# Patient Record
Sex: MALE | Race: WHITE | NOT HISPANIC OR LATINO | Employment: OTHER | URBAN - METROPOLITAN AREA
[De-identification: names, ages, dates, MRNs, and addresses within clinical notes are randomized per-mention and may not be internally consistent; named-entity substitution may affect disease eponyms.]

---

## 2021-11-22 PROBLEM — R94.31 ABNORMAL EKG: Status: ACTIVE | Noted: 2021-11-22

## 2021-11-22 PROBLEM — I10 ESSENTIAL HYPERTENSION: Status: ACTIVE | Noted: 2021-11-22

## 2021-11-22 PROBLEM — R42 DIZZINESS: Status: ACTIVE | Noted: 2021-11-22

## 2021-11-22 PROBLEM — R07.9 CHEST PAIN: Status: ACTIVE | Noted: 2021-11-22

## 2021-11-22 PROBLEM — E78.5 HYPERLIPIDEMIA LDL GOAL <100: Status: ACTIVE | Noted: 2021-11-22

## 2021-11-22 PROBLEM — I25.118 CORONARY ARTERY DISEASE OF NATIVE ARTERY OF NATIVE HEART WITH STABLE ANGINA PECTORIS: Status: ACTIVE | Noted: 2021-11-22

## 2021-11-22 NOTE — PROGRESS NOTES
OFFICE VISIT  NOTE  [facility] CARDIOLOGY      Name: Len Saenz    Date: 2021  MRN:  [id number]  :  1955      REFERRING/PRIMARY PROVIDER:  Dania Kuhn PA    Chief Complaint   Patient presents with   • Abnormal ECG       HPI: Len Saenz is a 66 y.o. male who presents today for new consultation for abnormal EKG. history of CAD status post cath 3/2012 with Dr. Rodgers, 50% LAD lesion at that time.  History of hypertension and hyperlipidemia.  Recent dizzy spells when turning his head, diagnosed with BPPV saw ENT, symptoms resolved.  Does get slight dizziness when he stands up.  Heart rate jumps up and down, can go above 100 at times at home but overall asymptomatic, no palpitations.  Found to have abnormal EKG at PCPs office, computer read junctional rhythm but it appears sinus.  Denies chest pain with exertion such as doing outdoor activity.  Recent diagnosis of prostate cancer may be starting radiation therapy soon.    Past Medical History:   Diagnosis Date   • Cancer (HCC)     Prostate cancer, starts treatment approx. 2022. Low grade   • Coronary artery disease    • Hyperlipidemia    • Hypertension        Past Surgical History:   Procedure Laterality Date   • CARDIAC CATHETERIZATION      3/2012 with Dr. Rodgers       Social History     Socioeconomic History   • Marital status:    Tobacco Use   • Smoking status: Former Smoker     Types: Cigarettes     Quit date: 2012     Years since quittin.0   • Smokeless tobacco: Never Used   • Tobacco comment: Social    Vaping Use   • Vaping Use: Never used   Substance and Sexual Activity   • Alcohol use: Yes     Comment: Social only   • Drug use: Never   • Sexual activity: Defer       Family History   Problem Relation Age of Onset   • Liver disease Mother    • Heart failure Father    • Ovarian cancer Daughter    • Heart disease Brother    • Heart disease Brother         ROS:   Constitutional no fever,  no weight loss  "  Skin no rash, no subcutaneous nodules   Otolaryngeal no difficulty swallowing   Cardiovascular See HPI   Pulmonary no cough, no sputum production   Gastrointestinal no constipation, no diarrhea   Genitourinary no dysuria, no hematuria   Hematologic no easy bruisability, no abnormal bleeding   Musculoskeletal no muscle pain   Neurologic no dizziness, no falls         Allergies   Allergen Reactions   • Bactrim [Sulfamethoxazole-Trimethoprim] Rash     Rash, Hives and Itching         Current Outpatient Medications:   •  aspirin 81 MG EC tablet, Take 81 mg by mouth Daily., Disp: , Rfl:   •  Cholecalciferol (Vitamin D-3) 25 MCG (1000 UT) capsule, Take 1,000 Units by mouth Daily., Disp: , Rfl:   •  lisinopril (PRINIVIL,ZESTRIL) 10 MG tablet, Take 10 mg by mouth Daily., Disp: , Rfl:   •  rosuvastatin (CRESTOR) 10 MG tablet, Take 10 mg by mouth Daily., Disp: , Rfl:   •  vitamin B-12 (CYANOCOBALAMIN) 100 MCG tablet, Take 50 mcg by mouth Daily., Disp: , Rfl:     Vitals:    11/30/21 1313   BP: 126/86   BP Location: Right arm   Patient Position: Sitting   Cuff Size: Adult   Pulse: 103   SpO2: 95%   Weight: 97.5 kg (215 lb)   Height: 182.9 cm (72\")     Body mass index is 29.16 kg/m².    PHYSICAL EXAM:    General Appearance:   · well developed  · well nourished  HENT:   · oropharynx moist  · lips not cyanotic  Neck:  · thyroid not enlarged  · supple  Respiratory:  · no respiratory distress  · normal breath sounds  · no rales  Cardiovascular:  · no jugular venous distention  · regular rhythm  · apical impulse normal  · S1 normal, S2 normal  · no S3, no S4   · 2/6 systolic murmur  · no rub, no thrill  · carotid pulses normal; no bruit  · lower extremity edema: none      Musculoskeletal:  · no clubbing of fingers.   · normocephalic, head atraumatic  Skin:   · warm, dry  Psychiatric:  · judgement and insight appropriate  · normal mood and affect    RESULTS:     ECG 12 Lead    Date/Time: 11/30/2021 1:49 PM  Performed by: Markie" Jay WILLINGHAM MD  Authorized by: Jay Aden MD   Comparison: not compared with previous ECG   Rhythm: sinus tachycardia  Rate: tachycardic  BPM: 103  QRS axis: normal    Clinical impression: non-specific ECG                  Labs:  No results found for: CHOL, TRIG, HDL, LDL, AST, ALT  No results found for: HGBA1C  No components found for: CREATINININE  No results found for: EGFRIFNONA    Most recent PCP note, imaging tests, and labs reviewed.    ASSESSMENT:  Problem List Items Addressed This Visit        Cardiac and Vasculature    Hyperlipidemia LDL goal <70    Relevant Medications    rosuvastatin (CRESTOR) 10 MG tablet    Essential hypertension    Relevant Medications    lisinopril (PRINIVIL,ZESTRIL) 10 MG tablet    Chest pain - Primary    Overview     2011 [facility] nonobstructive CAD 50% LAD         Coronary artery disease of native artery of native heart with stable angina pectoris (HCC)    Overview     2011 [facility] nonobstructive CAD 50% LAD         Abnormal EKG       Symptoms and Signs    Dizziness          PLAN:    1.  Murmur:  It was transient on exam, proceed with echocardiogram for further evaluation.    2.  Abnormal EKG:  EKG at PCPs office reviewed, it does not appear to be junctional rhythm, it appears to be sinus rhythm, no specific change, today's EKG shows sinus tachycardia with a heart rate 103.  No ischemic symptoms currently, defer ischemic evaluation at this time.    3.  Sinus tachycardia:  Advised patient increase water intake to 64 ounces daily.  May need low-dose metoprolol in the future if he becomes symptomatic with palpitations.    4.  CAD:  Continue secondary prevention with aspirin and statin therapy.  He states LDL was 30 last checked.    5.  Hyperlipidemia:  Continue atorvastatin current dose.    5.  Hypertension:  Home blood pressure log reviewed, well controlled currently.  Continue current medical regimen        Return to clinic in 9 months, or sooner as needed.    Thank you for the  opportunity to share in the care of your patient; please do not hesitate to call me with any questions.     Jay Aden MD, [facility]  Office: [phone] [address] [place] [address]    11/30/21

## 2021-11-30 ENCOUNTER — OFFICE VISIT (OUTPATIENT)
Dept: CARDIOLOGY | Facility: CLINIC | Age: 66
End: 2021-11-30

## 2021-11-30 VITALS
SYSTOLIC BLOOD PRESSURE: 126 MMHG | BODY MASS INDEX: 29.12 KG/M2 | OXYGEN SATURATION: 95 % | HEIGHT: 72 IN | HEART RATE: 103 BPM | DIASTOLIC BLOOD PRESSURE: 86 MMHG | WEIGHT: 215 LBS

## 2021-11-30 DIAGNOSIS — E78.5 HYPERLIPIDEMIA LDL GOAL <70: ICD-10-CM

## 2021-11-30 DIAGNOSIS — I25.118 CORONARY ARTERY DISEASE OF NATIVE ARTERY OF NATIVE HEART WITH STABLE ANGINA PECTORIS (HCC): ICD-10-CM

## 2021-11-30 DIAGNOSIS — I10 ESSENTIAL HYPERTENSION: ICD-10-CM

## 2021-11-30 DIAGNOSIS — R07.9 CHEST PAIN, UNSPECIFIED TYPE: Primary | ICD-10-CM

## 2021-11-30 DIAGNOSIS — R42 DIZZINESS: ICD-10-CM

## 2021-11-30 DIAGNOSIS — R94.31 ABNORMAL EKG: ICD-10-CM

## 2021-11-30 PROCEDURE — 99204 OFFICE O/P NEW MOD 45 MIN: CPT | Performed by: INTERNAL MEDICINE

## 2021-11-30 PROCEDURE — 93000 ELECTROCARDIOGRAM COMPLETE: CPT | Performed by: INTERNAL MEDICINE

## 2021-11-30 RX ORDER — ROSUVASTATIN CALCIUM 10 MG/1
10 TABLET, COATED ORAL DAILY
COMMUNITY
End: 2022-06-21 | Stop reason: SDUPTHER

## 2021-11-30 RX ORDER — ASPIRIN 81 MG/1
81 TABLET ORAL DAILY
COMMUNITY

## 2021-11-30 RX ORDER — CHOLECALCIFEROL (VITAMIN D3) 25 MCG
1000 CAPSULE ORAL DAILY
COMMUNITY

## 2021-11-30 RX ORDER — LISINOPRIL 10 MG/1
10 TABLET ORAL DAILY
COMMUNITY
End: 2022-06-21 | Stop reason: SDUPTHER

## 2021-11-30 RX ORDER — UBIDECARENONE 75 MG
50 CAPSULE ORAL DAILY
COMMUNITY

## 2021-12-16 ENCOUNTER — TELEPHONE (OUTPATIENT)
Dept: CARDIOLOGY | Facility: CLINIC | Age: 66
End: 2021-12-16

## 2021-12-16 NOTE — TELEPHONE ENCOUNTER
----- Message from Jay Aden MD sent at 12/15/2021  6:19 PM EST -----  Please inform the patient of their test results. Thank you.

## 2021-12-28 ENCOUNTER — TELEPHONE (OUTPATIENT)
Dept: RADIATION ONCOLOGY | Facility: HOSPITAL | Age: 66
End: 2021-12-28

## 2022-01-03 ENCOUNTER — HOSPITAL ENCOUNTER (OUTPATIENT)
Dept: RADIATION ONCOLOGY | Facility: HOSPITAL | Age: 67
Setting detail: RADIATION/ONCOLOGY SERIES
Discharge: HOME | End: 2022-01-03

## 2022-01-03 ENCOUNTER — OFFICE VISIT (OUTPATIENT)
Dept: RADIATION ONCOLOGY | Facility: HOSPITAL | Age: 67
End: 2022-01-03

## 2022-01-03 VITALS
DIASTOLIC BLOOD PRESSURE: 81 MMHG | HEART RATE: 95 BPM | RESPIRATION RATE: 16 BRPM | SYSTOLIC BLOOD PRESSURE: 122 MMHG | BODY MASS INDEX: 29.28 KG/M2 | HEIGHT: 72 IN | TEMPERATURE: 97.9 F | OXYGEN SATURATION: 98 % | WEIGHT: 216.2 LBS

## 2022-01-03 DIAGNOSIS — C61 PROSTATE CANCER: Primary | ICD-10-CM

## 2022-01-03 PROCEDURE — G0463 HOSPITAL OUTPT CLINIC VISIT: HCPCS

## 2022-01-03 NOTE — PROGRESS NOTES
CONSULTATION NOTE      :                                                          1955  DATE OF CONSULTATION:                       1/3/2022   REQUESTING PHYSICIAN:                   Bernardino Bahena, *  REASON FOR CONSULTATION:           Prostate cancer (HCC)  - Stage I (cT1c, cN0, cM0, PSA: 5.3, Grade Group: 1)       BRIEF HISTORY:  The patient is a very pleasant 66 y.o. male  with recently diagnosed prostate cancer.  He is known to me as his wife is a patient of mine and a long-term survivor of metastatic neoplasm.  He has family history of prostate cancer in a brother who underwent conventionally fractionated radiotherapy and is in remission.  He has a personal history of BPH, ED, and episode of hematuria in 2017 with negative work-up on cystoscopy and CT urethrogram.  He has had slowly rising PSA values increasing from 2.6 ng/mL in  to the more recent elevated values of 4.01 ng/ml in May 2021 and maximum value of 5.25 ng/ml on 2021.  Prostate biopsy performed 2021 initially reported ASAP involving 1 out of 12 cores from the left lateral apex.  External pathology review at [facility] by Dr. Ruano reported presence of prostatic adenocarcinoma, Laureen's 3+3 = 6, involving a small focus, less than 5% of submitted tissue.  There was presence of perineural invasion.  Patient is otherwise very healthy and active.  He certainly has a life expectancy of greater than 10 years predicted.  He would like to proceed with definitive treatment and is interested in a nonsurgical treatment approach.    Allergy:   Allergies   Allergen Reactions   • Bactrim [Sulfamethoxazole-Trimethoprim] Rash     Rash, Hives and Itching       Social History:   Social History     Socioeconomic History   • Marital status:    Tobacco Use   • Smoking status: Former Smoker     Types: Cigarettes     Quit date: 2012     Years since quittin.0   • Smokeless tobacco: Never Used   • Tobacco comment: Social     Vaping Use   • Vaping Use: Never used   Substance and Sexual Activity   • Alcohol use: Yes     Comment: Social only once per month   • Drug use: Never   • Sexual activity: Defer       Past Medical History:   Past Medical History:   Diagnosis Date   • Cancer (HCC)     Prostate cancer, starts treatment approx. Jan 2022. Low grade   • Coronary artery disease    • Hyperlipidemia    • Hypertension    • Prostate cancer (HCC)        Family History: family history includes Heart disease in his brother, brother, and mother; Liver disease in his father; Ovarian cancer in his daughter.     Surgical History:   Past Surgical History:   Procedure Laterality Date   • APPENDECTOMY     • CARDIAC CATHETERIZATION      3/2012 with Dr. Rodgers   • COLONOSCOPY  2017   • PROSTATE BIOPSY          Review of Systems:   Review of Systems   Neurological: Positive for dizziness and light-headedness.                IPSS Questionnaire (AUA-7):  Over the past month…    1)  Incomplete Emptying  How often have you had a sensation of not emptying your bladder?  1 - Less than 1 time in 5   2)  Frequency  How often have you had to urinate less than every two hours? 1 - Less than 1 time in 5   3)  Intermittency  How often have you found you stopped and started again several times when you urinated?  2 - Less than half the time   4) Urgency  How often have you found it difficult to postpone urination?  1 - Less than 1 time in 5   5) Weak Stream  How often have you had a weak urinary stream?  1 - Less than 1 time in 5   6) Straining  How often have you had to push or strain to begin urination?  0 - Not at all   7) Nocturia  How many times did you typically get up at night to urinate?  2 - 2 times   Total Score:  8       Quality of life due to urinary symptoms:  If you were to spend the rest of your life with your urinary condition the way it is now, how would you feel about that? 3-Mixed   Urine Leakage (Incontinence) 1-Mild (A few drops a day, no pad  "use)     Sexual Health Inventory  Current Status    1)  How do you rate your confidence that you could achieve and keep an erection? 1-Very Low   2) When you had erections with sexual stimulation, how often were your erections hard enough for penetration (entering your partner)? 0-No sexual activity   3)  During sexual intercourse, how often were you able to maintain your erection after you had penetrated (entered) into your partner? 0-Did not attempt intercourse   4) During sexual intercourse, how difficult was it to maintain your erection to completion of intercourse? 0-Did not attempt intercourse   5) When you attempted sexual intercourse, how often was it satisfactory to you? 0-No sexual activity   Total Score: 1       Bowel Health Inventory  Current Status: 0-No problems, no rectal bleeding, no discharge, less then 5 bowel movements a day                 Objective   VITAL SIGNS:   Vitals:    01/03/22 1257   BP: 122/81   Pulse: 95   Resp: 16   Temp: 97.9 °F (36.6 °C)   SpO2: 98%  Comment: RA   Weight: 98.1 kg (216 lb 3.2 oz)   Height: 182.9 cm (72\")   PainSc: 0-No pain        Karnofsky score: 90       Physical Exam:   Physical Exam  Vitals and nursing note reviewed.   Constitutional:       Appearance: He is well-developed.   HENT:      Head: Normocephalic and atraumatic.   Cardiovascular:      Rate and Rhythm: Normal rate and regular rhythm.      Heart sounds: Normal heart sounds. No murmur heard.      Pulmonary:      Effort: Pulmonary effort is normal.      Breath sounds: Normal breath sounds. No wheezing or rales.   Chest:   Breasts:      Right: No supraclavicular adenopathy.      Left: No supraclavicular adenopathy.       Abdominal:      General: Bowel sounds are normal. There is no distension.      Palpations: Abdomen is soft.      Tenderness: There is no abdominal tenderness.   Genitourinary:     Prostate: Enlarged ( 40 to 50 cc, symmetrically enlarged, smooth, no nodules or induration.  Seminal vesicles " are nonpalpable.). Not tender and no nodules present.      Rectum: No mass, tenderness, external hemorrhoid or internal hemorrhoid. Normal anal tone.   Musculoskeletal:         General: No tenderness. Normal range of motion.      Cervical back: Normal range of motion and neck supple.   Lymphadenopathy:      Cervical: No cervical adenopathy.      Upper Body:      Right upper body: No supraclavicular adenopathy.      Left upper body: No supraclavicular adenopathy.   Skin:     General: Skin is warm and dry.   Neurological:      Mental Status: He is alert and oriented to person, place, and time.      Sensory: No sensory deficit.   Psychiatric:         Behavior: Behavior normal.         Thought Content: Thought content normal.         Judgment: Judgment normal.              The following portions of the patient's history were reviewed and updated as appropriate: allergies, current medications, past family history, past medical history, past social history, past surgical history and problem list.    Assessment:   Assessment      Prostate cancer, [place] 3+3 = 6, clinical stage I (T1c, N0, M0), PSA 5.25 ng/ml.  He has low risk disease with small volume and low-grade but presence of perineural invasion per outside pathology review at [facility] by Dr. Ruano.  Patient is otherwise very healthy and wishes to pursue definitive treatment intervention rather than active surveillance.  We reviewed the radiation treatment options of IMRT, SBRT and brachytherapy.  He would like to undergo definitive treatment with stereotactic body radiotherapy.  The CyberKnife treatment procedure has been reviewed in detail today and informed consent was obtained.    RECOMMENDATIONS: He will return to Dr. Bahena for placement of gold seed fiducials.  He will subsequently return here for treatment planning CT and MRI pelvis.  The prostate gland will receive 5 daily fractions of 7 Frye each, delivered on the CyberKnife.    Follow Up:   Return  in about 2 weeks (around 1/17/2022) for Office Visit, Simulation.  There are no diagnoses linked to this encounter.     Janak Mart MD

## 2022-01-04 DIAGNOSIS — C61 PROSTATE CANCER: Primary | ICD-10-CM

## 2022-01-20 ENCOUNTER — DOCUMENTATION (OUTPATIENT)
Dept: NUTRITION | Facility: HOSPITAL | Age: 67
End: 2022-01-20

## 2022-01-27 ENCOUNTER — HOSPITAL ENCOUNTER (OUTPATIENT)
Dept: RADIATION ONCOLOGY | Facility: HOSPITAL | Age: 67
Discharge: HOME | End: 2022-01-27

## 2022-01-27 ENCOUNTER — OFFICE VISIT (OUTPATIENT)
Dept: RADIATION ONCOLOGY | Facility: HOSPITAL | Age: 67
End: 2022-01-27

## 2022-01-27 ENCOUNTER — HOSPITAL ENCOUNTER (OUTPATIENT)
Dept: MRI IMAGING | Facility: HOSPITAL | Age: 67
Discharge: HOME | End: 2022-01-27
Admitting: RADIOLOGY

## 2022-01-27 VITALS
RESPIRATION RATE: 16 BRPM | OXYGEN SATURATION: 96 % | TEMPERATURE: 96.9 F | BODY MASS INDEX: 29.01 KG/M2 | WEIGHT: 214.2 LBS | HEIGHT: 72 IN | DIASTOLIC BLOOD PRESSURE: 82 MMHG | SYSTOLIC BLOOD PRESSURE: 134 MMHG | HEART RATE: 98 BPM

## 2022-01-27 DIAGNOSIS — C61 PROSTATE CANCER: ICD-10-CM

## 2022-01-27 PROCEDURE — 77290 THER RAD SIMULAJ FIELD CPLX: CPT | Performed by: RADIOLOGY

## 2022-01-27 PROCEDURE — G0463 HOSPITAL OUTPT CLINIC VISIT: HCPCS | Performed by: RADIOLOGY

## 2022-01-27 PROCEDURE — 77399 UNLISTED PX MED RADJ PHYSICS: CPT | Performed by: RADIOLOGY

## 2022-01-27 PROCEDURE — 72195 MRI PELVIS W/O DYE: CPT

## 2022-01-27 RX ORDER — TAMSULOSIN HYDROCHLORIDE 0.4 MG/1
1 CAPSULE ORAL DAILY
Qty: 30 CAPSULE | Refills: 11 | Status: SHIPPED | OUTPATIENT
Start: 2022-01-27 | End: 2022-03-31

## 2022-01-27 NOTE — PROGRESS NOTES
RE-EVALUATION    PATIENT:                                                      Len Saenz  :                                                          1955  DATE:                          2022   DIAGNOSIS:     Prostate cancer (HCC)  - Stage I (cT1c, cN0, cM0, PSA: 5.3, Grade Group: 1)         BRIEF HISTORY:  The patient is a very pleasant 66 y.o. male  with low risk prostate cancer but presence of perineural invasion.  He chose to undergo definitive treatment with stereotactic body radiotherapy.  He underwent placement of gold seed fiducials by Dr. Bahena without difficulty.  He has noted no hematuria or change in voiding.  He has had no other change in health since informed consent was obtained on 1/3/2022.  Remains a good candidate for SBRT.  He returns today for simulation in preparation for treatment.  Has been following all recommended dietary restrictions and prep.    Allergies   Allergen Reactions   • Bactrim [Sulfamethoxazole-Trimethoprim] Rash     Rash, Hives and Itching       Review of Systems   Neurological:        HX positional vertigo   All other systems reviewed and are negative.           past month…     1)  Incomplete Emptying  How often have you had a sensation of not emptying your bladder?  1 - Less than 1 time in 5   2)  Frequency  How often have you had to urinate less than every two hours? 1 - Less than 1 time in 5   3)  Intermittency  How often have you found you stopped and started again several times when you urinated?  2 - Less than half the time   4) Urgency  How often have you found it difficult to postpone urination?  1 - Less than 1 time in 5   5) Weak Stream  How often have you had a weak urinary stream?  1 - Less than 1 time in 5   6) Straining  How often have you had to push or strain to begin urination?  0 - Not at all   7) Nocturia  How many times did you typically get up at night to urinate?  2 - 2 times   Total Score:  8         Quality of life due to urinary  "symptoms:  If you were to spend the rest of your life with your urinary condition the way it is now, how would you feel about that? 3-Mixed   Urine Leakage (Incontinence) 1-Mild (A few drops a day, no pad use)      Sexual Health Inventory  Current Status     1)  How do you rate your confidence that you could achieve and keep an erection? 1-Very Low   2) When you had erections with sexual stimulation, how often were your erections hard enough for penetration (entering your partner)? 0-No sexual activity   3)  During sexual intercourse, how often were you able to maintain your erection after you had penetrated (entered) into your partner? 0-Did not attempt intercourse   4) During sexual intercourse, how difficult was it to maintain your erection to completion of intercourse? 0-Did not attempt intercourse   5) When you attempted sexual intercourse, how often was it satisfactory to you? 0-No sexual activity   Total Score: 1         Bowel Health Inventory  Current Status: 0-No problems, no rectal bleeding, no discharge, less then 5 bowel movements a day                Objective   VITAL SIGNS:   Vitals:    01/27/22 1001   BP: 134/82   Pulse: 98   Resp: 16   Temp: 96.9 °F (36.1 °C)   TempSrc: Temporal   SpO2: 96%  Comment: RA   Weight: 97.2 kg (214 lb 3.2 oz)   Height: 182.9 cm (72\")   PainSc: 0-No pain        Karnofsky score: 90       Physical Exam  Vitals and nursing note reviewed.   Constitutional:       Appearance: He is well-developed.   HENT:      Head: Normocephalic and atraumatic.   Cardiovascular:      Rate and Rhythm: Normal rate and regular rhythm.      Heart sounds: No murmur heard.      Pulmonary:      Effort: Pulmonary effort is normal.      Breath sounds: No wheezing or rales.   Chest:   Breasts:      Right: No supraclavicular adenopathy.      Left: No supraclavicular adenopathy.       Abdominal:      General: There is no distension.      Palpations: Abdomen is soft.      Tenderness: There is no abdominal " tenderness.   Musculoskeletal:         General: No tenderness. Normal range of motion.      Cervical back: Normal range of motion and neck supple.   Lymphadenopathy:      Cervical: No cervical adenopathy.      Upper Body:      Right upper body: No supraclavicular adenopathy.      Left upper body: No supraclavicular adenopathy.   Skin:     General: Skin is warm and dry.   Neurological:      Mental Status: He is alert and oriented to person, place, and time.      Sensory: No sensory deficit.   Psychiatric:         Behavior: Behavior normal.         Thought Content: Thought content normal.         Judgment: Judgment normal.              The following portions of the patient's history were reviewed and updated as appropriate: allergies, current medications, past family history, past medical history, past social history, past surgical history and problem list.      IMPRESSION:  Prostate cancer, Laureen 3+3 = 6, clinical stage I (T1c, N0, M0), PSA 5.25 ng/ml.    RECOMMENDATIONS: Treatment planning CT and MRI pelvis to be performed today.  Prostate gland will receive 5 daily fractions of 7 Frye each, delivered on the CyberKnife.         Janak Mart MD     Approximately 10 to 15 minutes was spent with patient and reviewing the information.

## 2022-02-01 ENCOUNTER — HOSPITAL ENCOUNTER (OUTPATIENT)
Dept: RADIATION ONCOLOGY | Facility: HOSPITAL | Age: 67
Setting detail: RADIATION/ONCOLOGY SERIES
Discharge: HOME | End: 2022-02-01

## 2022-02-08 PROCEDURE — 77300 RADIATION THERAPY DOSE PLAN: CPT | Performed by: RADIOLOGY

## 2022-02-08 PROCEDURE — 77334 RADIATION TREATMENT AID(S): CPT | Performed by: RADIOLOGY

## 2022-02-08 PROCEDURE — 77295 3-D RADIOTHERAPY PLAN: CPT | Performed by: RADIOLOGY

## 2022-02-21 ENCOUNTER — HOSPITAL ENCOUNTER (OUTPATIENT)
Dept: RADIATION ONCOLOGY | Facility: HOSPITAL | Age: 67
Discharge: HOME | End: 2022-02-21

## 2022-02-21 PROCEDURE — 77290 THER RAD SIMULAJ FIELD CPLX: CPT | Performed by: RADIOLOGY

## 2022-02-21 PROCEDURE — 77373 STRTCTC BDY RAD THER TX DLVR: CPT | Performed by: RADIOLOGY

## 2022-02-22 ENCOUNTER — HOSPITAL ENCOUNTER (OUTPATIENT)
Dept: RADIATION ONCOLOGY | Facility: HOSPITAL | Age: 67
Discharge: HOME | End: 2022-02-22

## 2022-02-22 PROCEDURE — 77290 THER RAD SIMULAJ FIELD CPLX: CPT | Performed by: RADIOLOGY

## 2022-02-22 PROCEDURE — 77373 STRTCTC BDY RAD THER TX DLVR: CPT | Performed by: RADIOLOGY

## 2022-02-23 ENCOUNTER — HOSPITAL ENCOUNTER (OUTPATIENT)
Dept: RADIATION ONCOLOGY | Facility: HOSPITAL | Age: 67
Discharge: HOME | End: 2022-02-23

## 2022-02-23 PROCEDURE — 77373 STRTCTC BDY RAD THER TX DLVR: CPT | Performed by: RADIOLOGY

## 2022-02-23 PROCEDURE — 77290 THER RAD SIMULAJ FIELD CPLX: CPT | Performed by: RADIOLOGY

## 2022-02-24 ENCOUNTER — HOSPITAL ENCOUNTER (OUTPATIENT)
Dept: RADIATION ONCOLOGY | Facility: HOSPITAL | Age: 67
Discharge: HOME | End: 2022-02-24

## 2022-02-24 PROCEDURE — 77373 STRTCTC BDY RAD THER TX DLVR: CPT | Performed by: RADIOLOGY

## 2022-02-24 PROCEDURE — 77290 THER RAD SIMULAJ FIELD CPLX: CPT | Performed by: RADIOLOGY

## 2022-02-25 ENCOUNTER — HOSPITAL ENCOUNTER (OUTPATIENT)
Dept: RADIATION ONCOLOGY | Facility: HOSPITAL | Age: 67
Discharge: HOME | End: 2022-02-25

## 2022-02-25 PROCEDURE — 77290 THER RAD SIMULAJ FIELD CPLX: CPT | Performed by: RADIOLOGY

## 2022-02-25 PROCEDURE — 77373 STRTCTC BDY RAD THER TX DLVR: CPT | Performed by: RADIOLOGY

## 2022-02-25 PROCEDURE — 77336 RADIATION PHYSICS CONSULT: CPT | Performed by: RADIOLOGY

## 2022-03-14 ENCOUNTER — TELEPHONE (OUTPATIENT)
Dept: RADIATION ONCOLOGY | Facility: HOSPITAL | Age: 67
End: 2022-03-14

## 2022-03-14 NOTE — TELEPHONE ENCOUNTER
Pt called stating he is concerned about his bp.  He takes bp medicine and since starting the tamsulosin his bp has dropped to 90/60 and he feels light headed at times.  He denies any trouble urinating.  Discussed with Dr. Mart and called pt back.  Instructed to stop tamsulosin and if he has trouble urinating to call back and we will start something else.  Pt verbalized understanding.

## 2022-03-31 ENCOUNTER — OFFICE VISIT (OUTPATIENT)
Dept: RADIATION ONCOLOGY | Facility: HOSPITAL | Age: 67
End: 2022-03-31

## 2022-03-31 ENCOUNTER — HOSPITAL ENCOUNTER (OUTPATIENT)
Dept: RADIATION ONCOLOGY | Facility: HOSPITAL | Age: 67
Setting detail: RADIATION/ONCOLOGY SERIES
Discharge: HOME | End: 2022-03-31

## 2022-03-31 DIAGNOSIS — C61 PROSTATE CANCER: Primary | ICD-10-CM

## 2022-03-31 NOTE — PROGRESS NOTES
TELEMEDICINE FOLLOW UP NOTE    PATIENT:                                                      Len Saenz  MEDICAL RECORD #:                        [id number]  :                                                          1955  COMPLETION DATE:   2022  DIAGNOSIS:     Prostate cancer (HCC)  - Stage I (cT1c, cN0, cM0, PSA: 5.3, Grade Group: 1)    This visit has been rescheduled as a phone visit to comply with patient safety concerns in accordance with CDC recommendations in light of the COVID-19 pandemic.  Total time of discussion was 21 minutes.  Verbal consent given.    COVID-19 RISK SCREEN     1. Has the patient had close contact or exposure without PPE with a lab confirmed COVID-19 (+) person or a person under investigation (PUI) for COVID-19 infection?  -- No     2. Has the patient had respiratory symptoms, worsened/new cough and/or SOA, unexplained fever, or sudden loss of smell and/or taste in the past week? --  No    3. Has the patient completed COVID vaccination? --  Yes    4. Has the patient completed their Booster dose? --  Yes         BRIEF HISTORY:    Initial follow-up visit via telemedicine.  He has a history of low risk prostate cancer but presence of perineural invasion.  He underwent definitive treatment with CyberKnife SBRT.  He tolerated treatment well.  Grade 1 fatigue continues to raquel.  He did experience mild dysuria which was managed with OTC Azo and has since resolved.    He otherwise denies change to his baseline, mild urinary outflow obstructive symptoms.  No hematuria or incontinence.  He has discontinued use of Flomax related to hypotensive episodes.  He reports no change in urinary symptoms since discontinuation of alpha-blocker.  BP is now running in the low 100s/60s-70s.    Bowel function remains normal.  He continues with stable, baseline erectile dysfunction.  No new aches or pains.  Overall, he feels well.      IPSS Questionnaire (AUA-7):  Over the past month…    1)   Incomplete Emptying  How often have you had a sensation of not emptying your bladder?  1 - Less than 1 time in 5   2)  Frequency  How often have you had to urinate less than every two hours? 1 - Less than 1 time in 5   3)  Intermittency  How often have you found you stopped and started again several times when you urinated?  2 - Less than half the time   4) Urgency  How often have you found it difficult to postpone urination?  1 - Less than 1 time in 5   5) Weak Stream  How often have you had a weak urinary stream?  1 - Less than 1 time in 5   6) Straining  How often have you had to push or strain to begin urination?  0 - Not at all   7) Nocturia  How many times did you typically get up at night to urinate?  3 - 3 times   Total Score:  9       Quality of life due to urinary symptoms:  If you were to spend the rest of your life with your urinary condition the way it is now, how would you feel about that? 3-Mixed   Urine Leakage (Incontinence) 1-Mild (A few drops a day, no pad use)     Sexual Health Inventory  Current Status    1)  How do you rate your confidence that you could achieve and keep an erection? 1-Very Low   2) When you had erections with sexual stimulation, how often were your erections hard enough for penetration (entering your partner)? 0-No sexual activity   3)  During sexual intercourse, how often were you able to maintain your erection after you had penetrated (entered) into your partner? 0-Did not attempt intercourse   4) During sexual intercourse, how difficult was it to maintain your erection to completion of intercourse? 0-Did not attempt intercourse   5) When you attempted sexual intercourse, how often was it satisfactory to you? 0-No sexual activity   Total Score: 1       Bowel Health Inventory  Current Status: 0-No problems, no rectal bleeding, no discharge, less then 5 bowel movements a day           MEDICATIONS: Medication reconciliation for the patient was reviewed and confirmed in the  electronic medical record.    Review of Systems   Constitutional: Positive for fatigue.   Genitourinary: Positive for nocturia.    All other systems reviewed and are negative.          KPS 90%      Physical Exam  Pulmonary:      Respirations even, unlabored. No audible wheezing or cough.  Neurological:      A+Ox4, conversant, answers questions appropriately.  Psychiatric:     Judgement, affect, and decision-making WNL.    Limited physical exam as visit was conducted remotely via telephone in light of the COVID-19 pandemic.          The following portions of the patient's history were reviewed and updated as appropriate: allergies, current medications, past family history, past medical history, past social history, past surgical history and problem list.         Diagnoses and all orders for this visit:    1. Prostate cancer (HCC) (Primary)         IMPRESSION:  Prostate cancer, [place] 3+3 = 6, clinical stage I (T1c, N0, M0), PSA 5.25 ng/ml.  1 month status post CyberKnife SBRT.  He tolerated treatment well.  Acute residual radiation-related toxicities continue to raquel.  He has already discontinued use of Flomax.    The patient and I have reviewed the survivorship care plan in detail.  We discussed diagnosis, follow-up intervals, PSA monitoring, and expectations for response to treatment.  A copy of the care plan has been mailed to the patient.  A copy has also been sent to his PCP.    RECOMMENDATIONS:  Mr. Saenz continues urologic surveillance under the care of Dr. Bahena, with follow-up and repeat PSA scheduled 5/27/2022.    I spent a total of 35 minutes on today's visit, with more than 21 minutes in virtual communication with the patient via telephone, and the remainder of the time spent in reviewing the relevant history, records, available imaging, and for documentation.    Return in about 6 months (around 9/26/2022) for Office Visit.    Miky Hood, APRN

## 2022-06-21 ENCOUNTER — TELEPHONE (OUTPATIENT)
Dept: FAMILY MEDICINE CLINIC | Facility: CLINIC | Age: 67
End: 2022-06-21

## 2022-06-21 ENCOUNTER — OFFICE VISIT (OUTPATIENT)
Dept: FAMILY MEDICINE CLINIC | Facility: CLINIC | Age: 67
End: 2022-06-21

## 2022-06-21 VITALS
OXYGEN SATURATION: 98 % | HEART RATE: 89 BPM | DIASTOLIC BLOOD PRESSURE: 60 MMHG | HEIGHT: 72 IN | SYSTOLIC BLOOD PRESSURE: 106 MMHG | BODY MASS INDEX: 28.17 KG/M2 | WEIGHT: 208 LBS

## 2022-06-21 DIAGNOSIS — Z79.899 HIGH RISK MEDICATION USE: ICD-10-CM

## 2022-06-21 DIAGNOSIS — E53.8 B12 DEFICIENCY: ICD-10-CM

## 2022-06-21 DIAGNOSIS — R94.31 ABNORMAL EKG: ICD-10-CM

## 2022-06-21 DIAGNOSIS — M47.9 SPONDYLOSIS: ICD-10-CM

## 2022-06-21 DIAGNOSIS — I25.10 ATHEROSCLEROSIS OF NATIVE CORONARY ARTERY OF NATIVE HEART WITHOUT ANGINA PECTORIS: ICD-10-CM

## 2022-06-21 DIAGNOSIS — Z00.01 ENCOUNTER FOR GENERAL ADULT MEDICAL EXAMINATION WITH ABNORMAL FINDINGS: Primary | ICD-10-CM

## 2022-06-21 DIAGNOSIS — Z79.899 ENCOUNTER FOR LONG-TERM (CURRENT) USE OF OTHER MEDICATIONS: ICD-10-CM

## 2022-06-21 DIAGNOSIS — I10 BENIGN ESSENTIAL HYPERTENSION: ICD-10-CM

## 2022-06-21 DIAGNOSIS — N52.9 PRIMARY ERECTILE DYSFUNCTION: ICD-10-CM

## 2022-06-21 DIAGNOSIS — E55.9 VITAMIN D DEFICIENCY: ICD-10-CM

## 2022-06-21 DIAGNOSIS — C61 PROSTATE CANCER: ICD-10-CM

## 2022-06-21 DIAGNOSIS — N40.1 LOWER URINARY TRACT SYMPTOMS DUE TO BENIGN PROSTATIC HYPERPLASIA: ICD-10-CM

## 2022-06-21 DIAGNOSIS — R53.83 FATIGUE, UNSPECIFIED TYPE: ICD-10-CM

## 2022-06-21 DIAGNOSIS — F41.9 ANXIETY: ICD-10-CM

## 2022-06-21 DIAGNOSIS — L98.9 SKIN LESION: ICD-10-CM

## 2022-06-21 DIAGNOSIS — E78.2 MIXED HYPERLIPIDEMIA: ICD-10-CM

## 2022-06-21 PROBLEM — R97.20 RAISED PROSTATE SPECIFIC ANTIGEN: Status: ACTIVE | Noted: 2021-10-12

## 2022-06-21 PROCEDURE — 36415 COLL VENOUS BLD VENIPUNCTURE: CPT | Performed by: FAMILY MEDICINE

## 2022-06-21 PROCEDURE — 99397 PER PM REEVAL EST PAT 65+ YR: CPT | Performed by: FAMILY MEDICINE

## 2022-06-21 RX ORDER — ROSUVASTATIN CALCIUM 10 MG/1
10 TABLET, COATED ORAL DAILY
Qty: 90 TABLET | Refills: 3 | Status: SHIPPED | OUTPATIENT
Start: 2022-06-21

## 2022-06-21 RX ORDER — LISINOPRIL 10 MG/1
10 TABLET ORAL DAILY
Qty: 90 TABLET | Refills: 3 | Status: SHIPPED | OUTPATIENT
Start: 2022-06-21

## 2022-06-21 NOTE — TELEPHONE ENCOUNTER
PT FOR GOT TO LET YOU KNOW THAT HE WILL NEED A YR OF REFILLS ON THE FOLLOWING MEDS/  LISINOPRIL (PRINIVIL, ZESTRIL) 10MG TABLET  ROSUVASTATIN (CRESTOR) 10 MG TABLET  PLEASE SEND THESE OVER TO [facility] ON [address].

## 2022-06-22 LAB
ALBUMIN SERPL-MCNC: 4.3 G/DL (ref 3.8–4.8)
ALBUMIN/GLOB SERPL: 1.6 {RATIO} (ref 1.2–2.2)
ALP SERPL-CCNC: 82 IU/L (ref 44–121)
ALT SERPL-CCNC: 17 IU/L (ref 0–44)
AST SERPL-CCNC: 18 IU/L (ref 0–40)
BASOPHILS # BLD AUTO: 0.1 X10E3/UL (ref 0–0.2)
BASOPHILS NFR BLD AUTO: 1 %
BILIRUB SERPL-MCNC: 0.3 MG/DL (ref 0–1.2)
BUN SERPL-MCNC: 13 MG/DL (ref 8–27)
BUN/CREAT SERPL: 13 (ref 10–24)
CALCIUM SERPL-MCNC: 10.1 MG/DL (ref 8.6–10.2)
CHLORIDE SERPL-SCNC: 100 MMOL/L (ref 96–106)
CHOLEST SERPL-MCNC: 113 MG/DL (ref 100–199)
CO2 SERPL-SCNC: 24 MMOL/L (ref 20–29)
CREAT SERPL-MCNC: 1.03 MG/DL (ref 0.76–1.27)
EGFRCR SERPLBLD CKD-EPI 2021: 79 ML/MIN/1.73
EOSINOPHIL # BLD AUTO: 0.3 X10E3/UL (ref 0–0.4)
EOSINOPHIL NFR BLD AUTO: 3 %
ERYTHROCYTE [DISTWIDTH] IN BLOOD BY AUTOMATED COUNT: 13 % (ref 11.6–15.4)
FOLATE SERPL-MCNC: 10.5 NG/ML
GLOBULIN SER CALC-MCNC: 2.7 G/DL (ref 1.5–4.5)
GLUCOSE SERPL-MCNC: 100 MG/DL (ref 65–99)
HCT VFR BLD AUTO: 45.5 % (ref 37.5–51)
HDLC SERPL-MCNC: 39 MG/DL
HGB BLD-MCNC: 15.2 G/DL (ref 13–17.7)
IMM GRANULOCYTES # BLD AUTO: 0 X10E3/UL (ref 0–0.1)
IMM GRANULOCYTES NFR BLD AUTO: 0 %
LDLC SERPL CALC-MCNC: 46 MG/DL (ref 0–99)
LYMPHOCYTES # BLD AUTO: 2 X10E3/UL (ref 0.7–3.1)
LYMPHOCYTES NFR BLD AUTO: 26 %
MCH RBC QN AUTO: 30.9 PG (ref 26.6–33)
MCHC RBC AUTO-ENTMCNC: 33.4 G/DL (ref 31.5–35.7)
MCV RBC AUTO: 93 FL (ref 79–97)
MONOCYTES # BLD AUTO: 1.1 X10E3/UL (ref 0.1–0.9)
MONOCYTES NFR BLD AUTO: 14 %
NEUTROPHILS # BLD AUTO: 4.3 X10E3/UL (ref 1.4–7)
NEUTROPHILS NFR BLD AUTO: 56 %
PLATELET # BLD AUTO: 212 X10E3/UL (ref 150–450)
POTASSIUM SERPL-SCNC: 4.1 MMOL/L (ref 3.5–5.2)
PROT SERPL-MCNC: 7 G/DL (ref 6–8.5)
RBC # BLD AUTO: 4.92 X10E6/UL (ref 4.14–5.8)
SODIUM SERPL-SCNC: 139 MMOL/L (ref 134–144)
TRIGL SERPL-MCNC: 166 MG/DL (ref 0–149)
TSH SERPL DL<=0.005 MIU/L-ACNC: 2.91 UIU/ML (ref 0.45–4.5)
VIT B12 SERPL-MCNC: 813 PG/ML (ref 232–1245)
VLDLC SERPL CALC-MCNC: 28 MG/DL (ref 5–40)
WBC # BLD AUTO: 7.7 X10E3/UL (ref 3.4–10.8)

## 2022-06-23 NOTE — PROGRESS NOTES
"Chief Complaint  Annual Exam (Medicare wellness visit )    Subjective      Len Saenz presents to [facility]  History of Present Illness  Patient is here for annual physical exam.  States has been feeling well lately.  He was diagnosed with prostate cancer last year, but had 5 radiation treatments (which she calls CyberKnife) and those were successful, and his most recent PSA was down to 0.9, quite a drop from his baseline.  He also had a cardiac work-up last year and everything was okay, will be following up with cardiologist in September.  His only new complaint is he has a couple skin lesions 1 on his left side of his face and one on his left shin that probably requires dermatology referral.    Objective   Vital Signs:   Vitals:    06/21/22 0945   BP: 106/60   BP Location: Left arm   Patient Position: Sitting   Cuff Size: Large Adult   Pulse: 89   SpO2: 98%   Weight: 94.3 kg (208 lb)   Height: 182.9 cm (72\")      /60 (BP Location: Left arm, Patient Position: Sitting, Cuff Size: Large Adult)   Pulse 89   Ht 182.9 cm (72\")   Wt 94.3 kg (208 lb)   SpO2 98%   BMI 28.21 kg/m²     Body mass index is 28.21 kg/m².    Review of Systems   Constitutional: Negative for activity change, appetite change, chills, diaphoresis, fever and unexpected weight loss.   HENT: Negative for ear discharge, ear pain, facial swelling, mouth sores, nosebleeds, rhinorrhea, sinus pressure, sore throat, swollen glands, trouble swallowing and voice change.    Eyes: Negative for blurred vision, double vision, pain, redness and visual disturbance.   Respiratory: Negative for cough, choking, chest tightness, shortness of breath and wheezing.    Cardiovascular: Negative for chest pain, palpitations and leg swelling.        PND, orthopnea   Gastrointestinal: Negative for abdominal distention, abdominal pain, anal bleeding, blood in stool, constipation, diarrhea, nausea, vomiting and GERD.        Dysphagia, " odynophagia   Endocrine: Negative for polydipsia, polyphagia and polyuria.   Genitourinary: Negative for dysuria, hematuria and urinary incontinence.   Musculoskeletal: Negative for arthralgias (unusual/atypica), back pain, gait problem, joint swelling, myalgias and neck pain.   Skin: Negative for rash, skin lesions (worrisome/suspicious) and bruise.   Allergic/Immunologic: Negative for food allergies.   Neurological: Negative for dizziness, tremors, seizures, syncope, weakness, light-headedness, numbness, headache and memory problem.   Hematological: Negative for adenopathy. Does not bruise/bleed easily.   Psychiatric/Behavioral: Negative for suicidal ideas and depressed mood. The patient is not nervous/anxious.        Past History:  Medical History: has a past medical history of Anxiety state, Appendicitis (1968), Cancer (HCC), Chest pain, Coronary arteriosclerosis, Coronary artery disease, Drug dependence (HCC), Herniated lumbar intervertebral disc  EPIDURAL STERIOD X2 , SUCCESFUL (2011), High risk medication use, Hyperlipidemia, Hypertension, Megaloblastic anemia, Nicotine dependence, Prostate cancer (HCC), Spondylosis, and Vitamin D deficiency.   Surgical History: has a past surgical history that includes Cardiac catheterization; Prostate biopsy; Colonoscopy (2017); Appendectomy; Prostate Fiducial Marker Placement; and Cyberknife (02/25/2022).   Family History: family history includes Alcohol abuse in his father; Coronary artery disease in an other family member; Heart disease in his brother, brother, mother, and another family member; Hyperlipidemia in some other family members; Hypertension in some other family members; Liver disease in his father; Ovarian cancer in his daughter; Prostate cancer in an other family member.   Social History: reports that he quit smoking about 9 years ago. His smoking use included cigarettes. He has never used smokeless tobacco. He reports current alcohol use. He reports that he  does not use drugs.      Current Outpatient Medications:   •  aspirin 81 MG EC tablet, Take 81 mg by mouth Daily., Disp: , Rfl:   •  Cholecalciferol (Vitamin D-3) 25 MCG (1000 UT) capsule, Take 1,000 Units by mouth Daily., Disp: , Rfl:   •  lisinopril (PRINIVIL,ZESTRIL) 10 MG tablet, Take 1 tablet by mouth Daily., Disp: 90 tablet, Rfl: 3  •  rosuvastatin (CRESTOR) 10 MG tablet, Take 1 tablet by mouth Daily., Disp: 90 tablet, Rfl: 3  •  vitamin B-12 (CYANOCOBALAMIN) 100 MCG tablet, Take 50 mcg by mouth Daily., Disp: , Rfl:     Allergies: Bactrim [sulfamethoxazole-trimethoprim]    Physical Exam  Constitutional:       General: He is not in acute distress.     Appearance: He is normal weight. He is not toxic-appearing.   HENT:      Head: Normocephalic and atraumatic.      Right Ear: Ear canal and external ear normal.      Left Ear: Ear canal and external ear normal.      Nose: Nose normal. No rhinorrhea.      Mouth/Throat:      Mouth: Mucous membranes are moist.      Pharynx: Oropharynx is clear. No posterior oropharyngeal erythema.   Eyes:      General: No scleral icterus.     Extraocular Movements: Extraocular movements intact.      Conjunctiva/sclera: Conjunctivae normal.      Pupils: Pupils are equal, round, and reactive to light.   Neck:      Vascular: No carotid bruit.   Cardiovascular:      Rate and Rhythm: Normal rate and regular rhythm.      Pulses: Normal pulses.      Heart sounds: Normal heart sounds. No murmur heard.    No gallop.   Pulmonary:      Effort: Pulmonary effort is normal.      Breath sounds: Normal breath sounds. No wheezing or rales.   Chest:      Chest wall: No tenderness.   Abdominal:      General: Bowel sounds are normal. There is no distension.      Palpations: Abdomen is soft. There is no mass.      Tenderness: There is no abdominal tenderness. There is no guarding or rebound.   Musculoskeletal:         General: No swelling, tenderness or deformity. Normal range of motion.      Cervical  back: Normal range of motion. No rigidity.      Right lower leg: No edema.      Left lower leg: No edema.   Lymphadenopathy:      Cervical: No cervical adenopathy.   Skin:     General: Skin is warm and dry.      Capillary Refill: Capillary refill takes less than 2 seconds.      Coloration: Skin is not pale.      Findings: Lesion (Crusty grayish-white oval 0.5 cm lesion just left of nose, and reddish flaky crusty slightly raised lesion on left shin about 0.75 cm that looks suspicious) present. No erythema or rash.   Neurological:      General: No focal deficit present.      Mental Status: He is alert and oriented to person, place, and time.      Cranial Nerves: No cranial nerve deficit.      Sensory: No sensory deficit.      Motor: No weakness.      Coordination: Coordination normal.      Gait: Gait normal.   Psychiatric:         Mood and Affect: Mood normal.         Behavior: Behavior normal.         Judgment: Judgment normal.          Result Review :                  Assessment and Plan   Diagnoses and all orders for this visit:    1. Encounter for general adult medical examination with abnormal findings (Primary)  Patient was praised for healthy diet and regular exercise which he has been doing very well with.  We will refill his current medications and check labs.  He will be referred to dermatology.  The lesion just left of the nose looks a little bit like a seborrheic keratosis, but it has gotten larger and is in a high risk area, so it needs evaluation.  The lesion on the left shin looks very suspicious for squamous cell carcinoma.  Preventive health measures were discussed and I will see him back annually or sooner if needed  2. Atherosclerosis of native coronary artery of native heart without angina pectoris    3. Anxiety    4. Abnormal EKG    5. Prostate cancer (HCC)    6. High risk medication use  -     CBC Auto Differential; Future  -     Comprehensive Metabolic Panel; Future  -     CBC Auto  Differential  -     Comprehensive Metabolic Panel    7. Lower urinary tract symptoms due to benign prostatic hyperplasia    8. Primary erectile dysfunction    9. Spondylosis    10. Vitamin D deficiency    11. Benign essential hypertension    12. Mixed hyperlipidemia  -     Lipid Panel; Future  -     Lipid Panel    13. Encounter for long-term (current) use of other medications    14. B12 deficiency  -     Vitamin B12; Future  -     Folate; Future  -     Vitamin B12  -     Folate    15. Fatigue, unspecified type  -     TSH; Future  -     TSH    16. Skin lesion  -     Ambulatory Referral to Dermatology    Other orders  -     lisinopril (PRINIVIL,ZESTRIL) 10 MG tablet; Take 1 tablet by mouth Daily.  Dispense: 90 tablet; Refill: 3  -     rosuvastatin (CRESTOR) 10 MG tablet; Take 1 tablet by mouth Daily.  Dispense: 90 tablet; Refill: 3                 Follow Up   Return in about 1 year (around 6/21/2023) for Nurse - AWV Subsequent, Annual physical.  Patient was given instructions and counseling regarding his condition or for health maintenance advice. Please see specific information pulled into the AVS if appropriate.     Bin Cui MD

## 2022-08-25 ENCOUNTER — OFFICE VISIT (OUTPATIENT)
Dept: RADIATION ONCOLOGY | Facility: HOSPITAL | Age: 67
End: 2022-08-25

## 2022-08-25 ENCOUNTER — HOSPITAL ENCOUNTER (OUTPATIENT)
Dept: RADIATION ONCOLOGY | Facility: HOSPITAL | Age: 67
Setting detail: RADIATION/ONCOLOGY SERIES
Discharge: HOME | End: 2022-08-25

## 2022-08-25 VITALS
BODY MASS INDEX: 28.48 KG/M2 | WEIGHT: 210.3 LBS | OXYGEN SATURATION: 95 % | RESPIRATION RATE: 20 BRPM | HEIGHT: 72 IN | DIASTOLIC BLOOD PRESSURE: 74 MMHG | HEART RATE: 94 BPM | TEMPERATURE: 97.3 F | SYSTOLIC BLOOD PRESSURE: 115 MMHG

## 2022-08-25 DIAGNOSIS — C61 PROSTATE CANCER: Primary | ICD-10-CM

## 2022-08-25 PROCEDURE — G0463 HOSPITAL OUTPT CLINIC VISIT: HCPCS

## 2022-08-25 NOTE — PROGRESS NOTES
FOLLOW UP NOTE    PATIENT:                                                      Len Saenz  MEDICAL RECORD #:                        [id number]  :                                                          1955  COMPLETION DATE:   2022  DIAGNOSIS:     Prostate cancer (HCC)  - Stage I (cT1c, cN0, cM0, PSA: 5.3, Grade Group: 1)      BRIEF HISTORY:    Routine follow-up visit.  He has a history of low risk prostate cancer treated with CyberKnife stereotactic body radiotherapy.  He tolerated treatment very well.  He has no complaints regarding urinary voiding.  Acute posttreatment dysuria resolved fairly quickly.  He is no longer taking Azo or Flomax.  Bowel function is normal.  No diarrhea or hematochezia.  Energy is good.  He has no limitations in activity.  PSA value 0.936 ng/ml 2022.    MEDICATIONS: Medication reconciliation for the patient was reviewed and confirmed in the electronic medical record.    Review of Systems   All other systems reviewed and are negative.            IPSS Questionnaire (AUA-7):  Over the past month…     1)  Incomplete Emptying  How often have you had a sensation of not emptying your bladder?  0 -not at all   2)  Frequency  How often have you had to urinate less than every two hours? 0 -not at all   3)  Intermittency  How often have you found you stopped and started again several times when you urinated?  0 -not at all   4) Urgency  How often have you found it difficult to postpone urination?  0 -not at all   5) Weak Stream  How often have you had a weak urinary stream?  1 - Less than 1 time in 5   6) Straining  How often have you had to push or strain to begin urination?  0 - Not at all   7) Nocturia  How many times did you typically get up at night to urinate?  1 -1 time   Total Score:  2         Quality of life due to urinary symptoms:  If you were to spend the rest of your life with your urinary condition the way it is now, how would you feel about that? 1 -pleased    Urine Leakage (Incontinence) 1-Mild (A few drops a day, no pad use)      Sexual Health Inventory  Current Status     1)  How do you rate your confidence that you could achieve and keep an erection? 1-Very Low   2) When you had erections with sexual stimulation, how often were your erections hard enough for penetration (entering your partner)? 0-No sexual activity   3)  During sexual intercourse, how often were you able to maintain your erection after you had penetrated (entered) into your partner? 0-Did not attempt intercourse   4) During sexual intercourse, how difficult was it to maintain your erection to completion of intercourse? 0-Did not attempt intercourse   5) When you attempted sexual intercourse, how often was it satisfactory to you? 0-No sexual activity   Total Score: 1         Bowel Health Inventory  Current Status: 0-No problems, no rectal bleeding, no discharge, less then 5 bowel movements a day                     Physical Exam  Vitals and nursing note reviewed.   Constitutional:       Appearance: He is well-developed.   HENT:      Head: Normocephalic and atraumatic.   Cardiovascular:      Rate and Rhythm: Normal rate and regular rhythm.      Heart sounds: Normal heart sounds. No murmur heard.  Pulmonary:      Effort: Pulmonary effort is normal.      Breath sounds: Normal breath sounds. No wheezing or rales.   Chest:   Breasts:      Right: No supraclavicular adenopathy.      Left: No supraclavicular adenopathy.       Abdominal:      General: Bowel sounds are normal. There is no distension.      Palpations: Abdomen is soft.      Tenderness: There is no abdominal tenderness.   Genitourinary:     Prostate: Enlarged ( 30 cc, symmetric enlarged, smooth, soft, no nodules or induration.). Not tender and no nodules present.      Rectum: No mass, tenderness, external hemorrhoid or internal hemorrhoid. Normal anal tone.   Musculoskeletal:         General: No tenderness. Normal range of motion.      Cervical  "back: Normal range of motion and neck supple.   Lymphadenopathy:      Cervical: No cervical adenopathy.      Upper Body:      Right upper body: No supraclavicular adenopathy.      Left upper body: No supraclavicular adenopathy.   Skin:     General: Skin is warm and dry.   Neurological:      Mental Status: He is alert and oriented to person, place, and time.      Sensory: No sensory deficit.   Psychiatric:         Behavior: Behavior normal.         Thought Content: Thought content normal.         Judgment: Judgment normal.         VITAL SIGNS:   Vitals:    08/25/22 1048   BP: 115/74   Pulse: 94   Resp: 20   Temp: 97.3 °F (36.3 °C)   TempSrc: Temporal   SpO2: 95%   Weight: 95.4 kg (210 lb 4.8 oz)   Height: 182.9 cm (72\")   PainSc: 0-No pain                   KSP %:  90    The following portions of the patient's history were reviewed and updated as appropriate: allergies, current medications, past family history, past medical history, past social history, past surgical history and problem list.         Diagnoses and all orders for this visit:    1. Prostate cancer (HCC) (Primary)         IMPRESSION:  Prostate cancer, [place] 3+3 = 6, clinical stage I (T1c, N0, M0), PSA 5.25 ng/ml.  He is more than 6 months status post CyberKnife stereotactic body radiotherapy.  He tolerated treatment extremely well.  He has had a very appropriate early clinical response and biochemical PSA reduction.  Prognosis remains excellent.    RECOMMENDATIONS: He will continue urology surveillance and biannual PSA testing scheduled to be repeated November 2022.  I would like to continue annual follow-up visits until he reaches target darren PSA of less than 0.5 ng/ml.  Patient was counseled that in some cases a benign PSA bounce may occur and that this does not affect ultimate prognosis.    I spent a total of 25 minutes on todays visit, with more than 15 minutes in direct face to face communication, and the remainder of the time spent in reviewing " the relevant history, records, available imaging, and for documentation.    Return in about 1 year (around 8/25/2023) for Office Visit.    Janak Mart MD

## 2022-09-01 ENCOUNTER — OFFICE VISIT (OUTPATIENT)
Dept: CARDIOLOGY | Facility: CLINIC | Age: 67
End: 2022-09-01

## 2022-09-01 VITALS
HEIGHT: 72 IN | WEIGHT: 211 LBS | SYSTOLIC BLOOD PRESSURE: 130 MMHG | HEART RATE: 89 BPM | OXYGEN SATURATION: 96 % | BODY MASS INDEX: 28.58 KG/M2 | DIASTOLIC BLOOD PRESSURE: 78 MMHG

## 2022-09-01 DIAGNOSIS — C61 PROSTATE CANCER: ICD-10-CM

## 2022-09-01 DIAGNOSIS — I25.10 ATHEROSCLEROSIS OF NATIVE CORONARY ARTERY OF NATIVE HEART WITHOUT ANGINA PECTORIS: Primary | ICD-10-CM

## 2022-09-01 DIAGNOSIS — I10 BENIGN ESSENTIAL HYPERTENSION: ICD-10-CM

## 2022-09-01 DIAGNOSIS — E78.5 HYPERLIPIDEMIA LDL GOAL <70: ICD-10-CM

## 2022-09-01 PROCEDURE — 99214 OFFICE O/P EST MOD 30 MIN: CPT | Performed by: INTERNAL MEDICINE

## 2023-08-24 ENCOUNTER — DOCUMENTATION (OUTPATIENT)
Dept: RADIATION ONCOLOGY | Facility: HOSPITAL | Age: 68
End: 2023-08-24
Payer: MEDICARE

## 2023-08-24 NOTE — PROGRESS NOTES
Telephone conversation with patient    I called him to see how he was doing after the recent death of his wife 5 days ago.  She had oligometastatic lung cancer and we treated her 10 years ago.    He was scheduled to see me today for routine follow-up visit 1-1/2 years after CyberKnife treatment for low risk prostate cancer.  He is clinically doing extremely well with no urinary complaints, bowel complaints or physical problems.  His PSA is 0.14 ng/ml.  He will continue urology follow-up with Dr. Bahena.  I will be happy to be available, as needed.  He will call if he develops any urinary tract symptoms or if the PSA ever rises to a value greater than 2 ng/ml consistently.

## 2024-01-08 ENCOUNTER — TELEPHONE (OUTPATIENT)
Dept: CARDIOLOGY | Facility: CLINIC | Age: 69
End: 2024-01-08
Payer: MEDICARE

## 2024-01-08 NOTE — TELEPHONE ENCOUNTER
Caller: Len Saenz    Relationship to patient: Self    Best call back number: [phone]    Chief complaint: PT HAS NOT BEEN SEEN IN OFFICE SINCE 09.01.22. HE HAS BEEN HAVING SOME ISSUES WITH HIS HEART RACING PERIODICALLY. THIS USUALLY LAST ABOUT A MIN. THIS HAPPENS EVERY FEW WEEKS OR SO. IT IS NOT RELATED TO ACTIVITY, IT HAPPENS AT REST AND WHILE ACTIVE. PLEASE REACH OUT TO GET PT SCHEDULED. HE LOST HIS WIFE ABOUT THREE MONTHS AGO AND IS UNDER STRESS.     Type of visit: ASAP    Requested date: ASAP    If rescheduling, when is the original appointment:     Additional notes:PT WOULD LIKE TO GET INTO THE [place] OFFICE BUT HE WILL DR TO WHATEVER OFFICE HAS SOONEST AVAILABILITY.

## 2024-01-21 NOTE — PROGRESS NOTES
OFFICE VISIT  NOTE  [facility] CARDIOLOGY      Name: Len Saenz    Date: 2024  MRN:  [id number]  :  1955      REFERRING/PRIMARY PROVIDER:  Bin Cui MD     Chief Complaint   Patient presents with    Atherosclerosis of native coronary artery of native heart w    Follow-up       HPI: Len Saenz is a 68 y.o. male who presents for CAD and abnormal EKG. History of CAD, cath 3/2012 with Dr. Rodgers, 50% LAD lesion at that time.  History of hypertension and hyperlipidemia. Heart rate jumps up and down, can go above 100 at times at home but overall asymptomatic, no palpitations.  Found to have abnormal EKG at PCPs office, computer read junctional rhythm but it was actually sinus.  Benign echo 2021.  Finished radiation treatment for prostate cancer 2022.  Unfortunately his wife passed away 2023.  Increased palpitations recently, not sleeping well at night, palpitations do not interfere with daily activities or exercise.       ROS:Pertinent positives as listed in the HPI.  All other systems reviewed and negative.    Past Medical History:   Diagnosis Date    Anxiety state     Appendicitis 1968    Cancer     Prostate cancer, starts treatment approx. 2022. Low grade    Chest pain     LHCATH-50% LAD NONOBSTRUCTIVE PER CARDIOLOGY    Coronary arteriosclerosis     Coronary artery disease     Drug dependence     Herniated lumbar intervertebral disc  EPIDURAL STERIOD X2 , SUCCESFUL     W LEFT LEG RADICULOPATHY    High risk medication use     Hyperlipidemia     Hypertension     Megaloblastic anemia     Nicotine dependence     Prostate cancer     Spondylosis     LUMBAR SPINE    Vitamin D deficiency        Past Surgical History:   Procedure Laterality Date    APPENDECTOMY      CARDIAC CATHETERIZATION      3/2012 with Dr. Rodgers    COLONOSCOPY  2017    CYBERKNIFE  2022    prostate    PROSTATE BIOPSY      PROSTATE FIDUCIAL MARKER PLACEMENT      22       Social History  "    Socioeconomic History    Marital status:    Tobacco Use    Smoking status: Former     Types: Cigarettes     Quit date: 2012     Years since quittin.1    Smokeless tobacco: Never    Tobacco comments:     Social    Vaping Use    Vaping Use: Never used   Substance and Sexual Activity    Alcohol use: Yes     Comment: Social only once per month    Drug use: Never    Sexual activity: Defer       Family History   Problem Relation Age of Onset    Heart disease Mother         CHF    Liver disease Father     Alcohol abuse Father     Heart disease Brother     Heart disease Brother     Ovarian cancer Daughter     Heart disease Other         PACEMAKER    NOT SPECIFIED WHICH BROTHER    Hypertension Other         NOT SPECIFIED    Hyperlipidemia Other         NOT SPECIFIED    Coronary artery disease Other         NOT SPECIFIED  (PREMATURE CAD)    Prostate cancer Other         NOT SPECIFIED    Hyperlipidemia Other     Hypertension Other         Allergies   Allergen Reactions    Bactrim [Sulfamethoxazole-Trimethoprim] Rash     Rash, Hives and Itching       Current Outpatient Medications   Medication Instructions    aspirin 81 mg, Oral, Daily    lisinopril (PRINIVIL,ZESTRIL) 10 mg, Oral, Daily    rosuvastatin (CRESTOR) 10 mg, Oral, Daily    vitamin B-12 (CYANOCOBALAMIN) 50 mcg, Oral, Daily    Vitamin D-3 1,000 Units, Oral, Daily       Vitals:    24 1147   BP: 112/74   BP Location: Left arm   Patient Position: Sitting   Cuff Size: Adult   Pulse: 95   SpO2: 97%   Weight: 103 kg (226 lb 3.2 oz)   Height: 182.9 cm (72\")       Body mass index is 30.68 kg/m².    PHYSICAL EXAM:    General Appearance:   well developed  well nourished  Neck:  thyroid not enlarged  supple  Respiratory:  no respiratory distress  normal breath sounds  no rales  Cardiovascular:  no jugular venous distention  regular rhythm  apical impulse normal  S1 normal, S2 normal  no S3, no S4   no murmur  no rub, no thrill  carotid pulses normal; " "no bruit  pedal pulses normal  lower extremity edema: none    Skin:   warm, dry    RESULTS:     ECG 12 Lead    Date/Time: 1/22/2024 12:08 PM  Performed by: Jay Aden MD    Authorized by: Jay Aden MD  Comparison: compared with previous ECG from 11/30/2021  Similar to previous ECG  Rhythm: sinus rhythm  Rate: normal  BPM: 92  QRS axis: normal    Clinical impression: non-specific ECG          Results for orders placed in visit on 12/14/21    Adult Transthoracic Echo Complete W/ Cont if Necessary Per Protocol    Interpretation Summary  · Left ventricular ejection fraction appears to be 56 - 60%. Left ventricular systolic function is normal.  · Left ventricular diastolic function was normal.        Labs:  Lab Results   Component Value Date    TRIG 163 (H) 06/22/2023    HDL 44 06/22/2023    LDL 55 06/22/2023    AST 25 06/22/2023    ALT 26 06/22/2023     Lab Results   Component Value Date    HGBA1C 5.8 (H) 06/22/2023     Creatinine   Date Value Ref Range Status   06/22/2023 0.94 0.76 - 1.27 mg/dL Final   06/21/2022 1.03 0.76 - 1.27 mg/dL Final     No results found for: \"EGFRIFNONA\"      ASSESSMENT:  Problem List Items Addressed This Visit       Hyperlipidemia LDL goal <70    Essential hypertension    Atherosclerosis of native coronary artery of native heart without angina pectoris - Primary    Overview     2011 [facility] nonobstructive CAD 50% LAD            PLAN:  1.  Murmur:  Echo 12/2021 benign with no significant valvular abnormalities   Not present on today's exam    2.  Palpitations:  Discussed importance of improving sleep although understandable with recent loss of his wife  Increase hydration and continue exercise    If any symptoms worsen we can consider adding low-dose metoprolol, at that time would likely discontinued lisinopril due to low normal blood pressure.     3.  Sinus tachycardia:  Advised patient increase water intake to 64 ounces daily.  May need low-dose metoprolol in the future if he " becomes symptomatic with palpitations.     4.  CAD:  Continue secondary prevention with aspirin and statin therapy.  LDL at target on recent labs     5.  Hyperlipidemia:  Labs reviewed, LDL at target  Continue atorvastatin current dose.     6.  Hypertension:  Goal <130/80mmHg   Continue current medical regimen  Low normal blood pressure consider stopping lisinopril at next PCP visit    7.  Insomnia/grief:  Mostly related to recent loss of his wife, encouraged him to continue good sleep habits and increase aerobic exercise and decrease caffeinated beverages which she does not drink much up.  He will discuss with PCP also        Advance Care Planning   ACP discussion was held with the patient during this visit. Patient does not have an advance directive, information provided.          Follow-up   Return in about 1 year (around 1/22/2025).    Jay Aden MD, FACC, [facility]  Interventional Cardiology

## 2024-01-22 ENCOUNTER — OFFICE VISIT (OUTPATIENT)
Dept: CARDIOLOGY | Facility: CLINIC | Age: 69
End: 2024-01-22
Payer: MEDICARE

## 2024-01-22 VITALS
WEIGHT: 226.2 LBS | HEIGHT: 72 IN | BODY MASS INDEX: 30.64 KG/M2 | HEART RATE: 95 BPM | OXYGEN SATURATION: 97 % | DIASTOLIC BLOOD PRESSURE: 74 MMHG | SYSTOLIC BLOOD PRESSURE: 112 MMHG

## 2024-01-22 DIAGNOSIS — I10 ESSENTIAL HYPERTENSION: ICD-10-CM

## 2024-01-22 DIAGNOSIS — I25.10 ATHEROSCLEROSIS OF NATIVE CORONARY ARTERY OF NATIVE HEART WITHOUT ANGINA PECTORIS: Primary | ICD-10-CM

## 2024-01-22 DIAGNOSIS — E78.5 HYPERLIPIDEMIA LDL GOAL <70: ICD-10-CM

## 2024-01-22 PROCEDURE — 3078F DIAST BP <80 MM HG: CPT | Performed by: INTERNAL MEDICINE

## 2024-01-22 PROCEDURE — 99214 OFFICE O/P EST MOD 30 MIN: CPT | Performed by: INTERNAL MEDICINE

## 2024-01-22 PROCEDURE — 93000 ELECTROCARDIOGRAM COMPLETE: CPT | Performed by: INTERNAL MEDICINE

## 2024-01-22 PROCEDURE — 3074F SYST BP LT 130 MM HG: CPT | Performed by: INTERNAL MEDICINE

## 2024-06-24 ENCOUNTER — OFFICE VISIT (OUTPATIENT)
Dept: FAMILY MEDICINE CLINIC | Facility: CLINIC | Age: 69
End: 2024-06-24
Payer: MEDICARE

## 2024-06-24 VITALS
DIASTOLIC BLOOD PRESSURE: 84 MMHG | WEIGHT: 226 LBS | HEIGHT: 72 IN | BODY MASS INDEX: 30.61 KG/M2 | HEART RATE: 85 BPM | SYSTOLIC BLOOD PRESSURE: 118 MMHG | OXYGEN SATURATION: 96 %

## 2024-06-24 DIAGNOSIS — R53.83 OTHER FATIGUE: ICD-10-CM

## 2024-06-24 DIAGNOSIS — I10 PRIMARY HYPERTENSION: ICD-10-CM

## 2024-06-24 DIAGNOSIS — D53.1 MEGALOBLASTIC ANEMIA: ICD-10-CM

## 2024-06-24 DIAGNOSIS — E55.9 VITAMIN D DEFICIENCY: ICD-10-CM

## 2024-06-24 DIAGNOSIS — E78.2 MIXED HYPERLIPIDEMIA: Primary | ICD-10-CM

## 2024-06-24 DIAGNOSIS — R73.09 HIGH GLUCOSE: ICD-10-CM

## 2024-06-24 DIAGNOSIS — C61 PROSTATE CANCER: ICD-10-CM

## 2024-06-24 PROCEDURE — 3074F SYST BP LT 130 MM HG: CPT | Performed by: FAMILY MEDICINE

## 2024-06-24 PROCEDURE — 1125F AMNT PAIN NOTED PAIN PRSNT: CPT | Performed by: FAMILY MEDICINE

## 2024-06-24 PROCEDURE — 1170F FXNL STATUS ASSESSED: CPT | Performed by: FAMILY MEDICINE

## 2024-06-24 PROCEDURE — 99214 OFFICE O/P EST MOD 30 MIN: CPT | Performed by: FAMILY MEDICINE

## 2024-06-24 PROCEDURE — 3079F DIAST BP 80-89 MM HG: CPT | Performed by: FAMILY MEDICINE

## 2024-06-24 PROCEDURE — G0439 PPPS, SUBSEQ VISIT: HCPCS | Performed by: FAMILY MEDICINE

## 2024-06-24 RX ORDER — ROSUVASTATIN CALCIUM 10 MG/1
10 TABLET, COATED ORAL DAILY
Qty: 90 TABLET | Refills: 3 | Status: SHIPPED | OUTPATIENT
Start: 2024-06-24

## 2024-06-24 RX ORDER — LISINOPRIL 10 MG/1
10 TABLET ORAL DAILY
Qty: 90 TABLET | Refills: 3 | Status: SHIPPED | OUTPATIENT
Start: 2024-06-24

## 2024-06-24 NOTE — PROGRESS NOTES
The ABCs of the Annual Wellness Visit  Subsequent Medicare Wellness Visit    Subjective    Len Saenz is a 69 y.o. male who presents for a Subsequent Medicare Wellness Visit.    The following portions of the patient's history were reviewed and   updated as appropriate: allergies, current medications, past family history, past medical history, past social history, past surgical history, and problem list.    Compared to one year ago, the patient feels his physical   health is the same.    Compared to one year ago, the patient feels his mental   health is the same.    Recent Hospitalizations:  He was not admitted to the hospital during the last year.       Current Medical Providers:  Patient Care Team:  Bin Cui MD as PCP - General (Family Medicine)  Bernardino Bahena MD as Referring Physician (Urology)  Janak Mart MD as Consulting Physician (Radiation Oncology)  Jay Aden MD as Consulting Physician (Cardiology)  Enrique Saldivar MD as Consulting Physician (Otolaryngology)  Jessica Pagan PA-C as Physician Assistant (Cardiology)    Outpatient Medications Prior to Visit   Medication Sig Dispense Refill    aspirin 81 MG EC tablet Take 1 tablet by mouth Daily.      Cholecalciferol (Vitamin D-3) 25 MCG (1000 UT) capsule Take 1,000 Units by mouth Daily.      lisinopril (PRINIVIL,ZESTRIL) 10 MG tablet TAKE 1 TABLET BY MOUTH DAILY 90 tablet 3    rosuvastatin (CRESTOR) 10 MG tablet TAKE 1 TABLET BY MOUTH DAILY 90 tablet 3    vitamin B-12 (CYANOCOBALAMIN) 100 MCG tablet Take 0.5 tablets by mouth Daily.       No facility-administered medications prior to visit.       No opioid medication identified on active medication list. I have reviewed chart for other potential  high risk medication/s and harmful drug interactions in the elderly.        Aspirin is on active medication list. Aspirin use is indicated based on review of current medical condition/s. Pros and cons of this therapy have  "been discussed today. Benefits of this medication outweigh potential harm.  Patient has been encouraged to continue taking this medication.  .      Patient Active Problem List   Diagnosis    Hyperlipidemia LDL goal <70    Essential hypertension    Dizziness    Chest pain    Atherosclerosis of native coronary artery of native heart without angina pectoris    Abnormal EKG    Prostate cancer    Anxiety    Encounter for long-term (current) use of other medications    Lower urinary tract symptoms due to benign prostatic hyperplasia    Primary erectile dysfunction    Raised prostate specific antigen    Spondylosis    Vitamin D deficiency    Coronary arteriosclerosis    Mixed hyperlipidemia    B12 deficiency     Advance Care Planning   Advance Care Planning     Advance Directive is on file.  ACP discussion was declined by the patient.       Objective    Vitals:    24 0855   BP: 118/84   Pulse: 85   SpO2: 96%   Weight: 103 kg (226 lb)   Height: 182.9 cm (72\")   PainSc:   4   PainLoc: Foot     Estimated body mass index is 30.65 kg/m² as calculated from the following:    Height as of this encounter: 182.9 cm (72\").    Weight as of this encounter: 103 kg (226 lb).    BMI is >= 30 and <35. (Class 1 Obesity). The following options were offered after discussion;: exercise counseling/recommendations      Does the patient have evidence of cognitive impairment? No          HEALTH RISK ASSESSMENT    Smoking Status:  Social History     Tobacco Use   Smoking Status Former    Current packs/day: 0.00    Types: Cigarettes    Quit date: 2012    Years since quittin.5    Passive exposure: Past   Smokeless Tobacco Never   Tobacco Comments    Social      Alcohol Consumption:  Social History     Substance and Sexual Activity   Alcohol Use Yes    Comment: Social only once per month     Fall Risk Screen:    JEFF Fall Risk Assessment was completed, and patient is at LOW risk for falls.Assessment completed " on:2024    Depression Screenin/24/2024     8:53 AM   PHQ-2/PHQ-9 Depression Screening   Little Interest or Pleasure in Doing Things 0-->not at all   Feeling Down, Depressed or Hopeless 0-->not at all   PHQ-9: Brief Depression Severity Measure Score 0       Health Habits and Functional and Cognitive Screenin/24/2024     8:53 AM   Functional & Cognitive Status   Do you have difficulty preparing food and eating? No   Do you have difficulty bathing yourself, getting dressed or grooming yourself? No   Do you have difficulty using the toilet? No   Do you have difficulty moving around from place to place? No   Do you have trouble with steps or getting out of a bed or a chair? No   Current Diet Well Balanced Diet   Dental Exam Up to date   Eye Exam Up to date   Exercise (times per week) 5 times per week   Current Exercises Include Yard Work;Walking        Exercise Comment golf   Do you need help using the phone?  No   Are you deaf or do you have serious difficulty hearing?  No   Do you need help to go to places out of walking distance? No   Do you need help shopping? No   Do you need help preparing meals?  No   Do you need help with housework?  No   Do you need help with laundry? No   Do you need help taking your medications? No   Do you need help managing money? No   Do you ever drive or ride in a car without wearing a seat belt? No   Have you felt unusual stress, anger or loneliness in the last month? No   Who do you live with? Alone   If you need help, do you have trouble finding someone available to you? No   Have you been bothered in the last four weeks by sexual problems? No   Do you have difficulty concentrating, remembering or making decisions? No       Age-appropriate Screening Schedule:  Refer to the list below for future screening recommendations based on patient's age, sex and/or medical conditions. Orders for these recommended tests are listed in the plan section. The patient has been  provided with a written plan.    Health Maintenance   Topic Date Due    HEPATITIS C SCREENING  Never done    AAA SCREEN (ONE-TIME)  Never done    LIPID PANEL  06/22/2024    BMI FOLLOWUP  06/22/2024    COVID-19 Vaccine (4 - 2023-24 season) 09/13/2024 (Originally 9/1/2023)    RSV Vaccine - Adults (1 - 1-dose 60+ series) 06/24/2025 (Originally 4/22/2013)    Pneumococcal Vaccine 65+ (2 of 2 - PCV) 06/24/2025 (Originally 10/16/2021)    TDAP/TD VACCINES (2 - Td or Tdap) 06/24/2025 (Originally 8/30/2022)    INFLUENZA VACCINE  08/01/2024    ANNUAL WELLNESS VISIT  06/24/2025    COLORECTAL CANCER SCREENING  08/23/2027    ZOSTER VACCINE  Completed                  CMS Preventative Services Quick Reference  Risk Factors Identified During Encounter  None Identified  The above risks/problems have been discussed with the patient.  Pertinent information has been shared with the patient in the After Visit Summary.  An After Visit Summary and PPPS were made available to the patient.    Follow Up:   Next Medicare Wellness visit to be scheduled in 1 year.       Additional E&M Note during same encounter follows:  Patient has multiple medical problems which are significant and separately identifiable that require additional work above and beyond the Medicare Wellness Visit.      Chief Complaint  Medicare Wellness-subsequent (Physical/Right foot pain. )    Subjective        HPI  Len Saenz is also being seen today for hyperlipidemia and?  Hypertension.  He knows he is got a Achilles tendinitis of right foot x 3 weeks but is getting better.  He lost his wife from cancer in July 2024.  But he do not want to take anything for depression.  But keeping a record of his blood pressure according to Dr. Aden.  As low as 75/52 as high as 120/85.  They going to take half a pill x 30 days and then decrease it but he is becoming prediabetic.  And he if he quits the diabetes pill he knows to check in 4 to 6 weeks after it has been DC'd      "    Objective   Vital Signs:  /84   Pulse 85   Ht 182.9 cm (72\")   Wt 103 kg (226 lb)   SpO2 96%   BMI 30.65 kg/m²     Physical Exam  Vitals and nursing note reviewed.   Constitutional:       General: He is not in acute distress.     Appearance: He is not diaphoretic.   HENT:      Head: Normocephalic and atraumatic.      Right Ear: Tympanic membrane, ear canal and external ear normal.      Left Ear: Tympanic membrane, ear canal and external ear normal.      Mouth/Throat:      Mouth: Mucous membranes are moist. Mucous membranes are dry.   Eyes:      Extraocular Movements: Extraocular movements intact.      Pupils: Pupils are equal, round, and reactive to light.   Cardiovascular:      Rate and Rhythm: Normal rate and regular rhythm.   Pulmonary:      Effort: Pulmonary effort is normal. No respiratory distress.      Breath sounds: Normal breath sounds. No wheezing or rales.   Abdominal:      General: Abdomen is flat. Bowel sounds are normal.      Palpations: Abdomen is soft. There is no mass.      Tenderness: There is no guarding or rebound.   Musculoskeletal:         General: Normal range of motion.      Cervical back: Normal range of motion and neck supple.      Right lower leg: No edema.      Left lower leg: No edema.   Skin:     General: Skin is warm and dry.   Neurological:      General: No focal deficit present.      Mental Status: He is alert and oriented to person, place, and time.      Motor: No weakness.   Psychiatric:         Mood and Affect: Mood normal.                         Assessment and Plan   Diagnoses and all orders for this visit:    1. Mixed hyperlipidemia (Primary)  crestor  2. Vitamin D deficiency  -     Vitamin D,25-Hydroxy  taking  3. Prostate cancer--psa = 0.073    4. Primary hypertension--to be resolved  -     Comprehensive Metabolic Panel    5. Other fatigue  -     CBC & Differential  -     TSH    6. Megaloblastic anemia  -     Vitamin B12 & Folate    7. High glucose  -     " Hemoglobin A1c             Follow Up   No follow-ups on file.  Patient was given instructions and counseling regarding his condition or for health maintenance advice. Please see specific information pulled into the AVS if appropriate.

## 2024-06-24 NOTE — PROGRESS NOTES
Office Note     Name: Len Saenz    : 1955     MRN: [id number]     Chief Complaint  Medicare Wellness-subsequent (Physical/Right foot pain. )    Subjective     History of Present Illness:  Len Saenz is a 69 y.o. male who presents today for ***    Review of Systems:   Review of Systems    Past Medical History:   Past Medical History:   Diagnosis Date    Anxiety state     Appendicitis 1968    Cancer     Prostate cancer, starts treatment approx. 2022. Low grade    Chest pain     LHCATH-50% LAD NONOBSTRUCTIVE PER CARDIOLOGY    Coronary arteriosclerosis     Coronary artery disease     Drug dependence     Herniated lumbar intervertebral disc  EPIDURAL STERIOD X2 , SUCCESFUL     W LEFT LEG RADICULOPATHY    High risk medication use     Hyperlipidemia     Hypertension     Megaloblastic anemia     Nicotine dependence     Prostate cancer     Spondylosis     LUMBAR SPINE    Vitamin D deficiency        Past Surgical History:   Past Surgical History:   Procedure Laterality Date    APPENDECTOMY      CARDIAC CATHETERIZATION      3/2012 with Dr. Rodgers    COLONOSCOPY  2017    CYBERKNIFE  2022    prostate    PROSTATE BIOPSY      PROSTATE FIDUCIAL MARKER PLACEMENT      22       Family History:   Family History   Problem Relation Age of Onset    Heart disease Mother         CHF    Liver disease Father     Alcohol abuse Father     Heart disease Brother     Heart disease Brother     Ovarian cancer Daughter     Heart disease Other         PACEMAKER    NOT SPECIFIED WHICH BROTHER    Hypertension Other         NOT SPECIFIED    Hyperlipidemia Other         NOT SPECIFIED    Coronary artery disease Other         NOT SPECIFIED  (PREMATURE CAD)    Prostate cancer Other         NOT SPECIFIED    Hyperlipidemia Other     Hypertension Other        Social History:   Social History     Socioeconomic History    Marital status:    Tobacco Use    Smoking status: Former     Current packs/day: 0.00     Types:  "Cigarettes     Quit date: 2012     Years since quittin.5     Passive exposure: Past    Smokeless tobacco: Never    Tobacco comments:     Social    Vaping Use    Vaping status: Never Used   Substance and Sexual Activity    Alcohol use: Yes     Comment: Social only once per month    Drug use: Never    Sexual activity: Defer       Immunizations:   Immunization History   Administered Date(s) Administered    COVID-19 (PFIZER) Purple Cap Monovalent 2021, 2021, 2021    Fluad Quad 65+ 10/19/2021, 10/05/2022    Fluzone (or Fluarix & Flulaval for VFC) >6mos 10/19/2017    Fluzone High-Dose 65+yrs 2020, 2023    Hepatitis A 10/08/2018, 04/10/2019    Influenza Quad Vaccine (Inpatient) 10/18/2016    Influenza, Unspecified 2020, 10/04/2022    Pneumococcal Polysaccharide (PPSV23) 10/16/2020    Shingrix 2019, 2020    Tdap 2012    Zoster, Unspecified 2019, 2020        Medications:     Current Outpatient Medications:     aspirin 81 MG EC tablet, Take 1 tablet by mouth Daily., Disp: , Rfl:     Cholecalciferol (Vitamin D-3) 25 MCG (1000 UT) capsule, Take 1,000 Units by mouth Daily., Disp: , Rfl:     lisinopril (PRINIVIL,ZESTRIL) 10 MG tablet, TAKE 1 TABLET BY MOUTH DAILY, Disp: 90 tablet, Rfl: 3    rosuvastatin (CRESTOR) 10 MG tablet, TAKE 1 TABLET BY MOUTH DAILY, Disp: 90 tablet, Rfl: 3    vitamin B-12 (CYANOCOBALAMIN) 100 MCG tablet, Take 0.5 tablets by mouth Daily., Disp: , Rfl:     Allergies:   Allergies   Allergen Reactions    Bactrim [Sulfamethoxazole-Trimethoprim] Rash     Rash, Hives and Itching       Objective     Vital Signs  /84   Pulse 85   Ht 182.9 cm (72\")   Wt 103 kg (226 lb)   SpO2 96%   BMI 30.65 kg/m²   Estimated body mass index is 30.65 kg/m² as calculated from the following:    Height as of this encounter: 182.9 cm (72\").    Weight as of this encounter: 103 kg (226 lb).    {BMI is >= 30 and <35. (Class 1 Obesity). The following " options were offered after discussion; (Optional):49772}      Physical Exam     Procedures     Assessment and Plan     1. Mixed hyperlipidemia  ***    2. Vitamin D deficiency  ***  - Vitamin D,25-Hydroxy    3. Prostate cancer  ***    4. Primary hypertension  ***  - Comprehensive Metabolic Panel    5. Other fatigue  ***  - CBC & Differential  - TSH    6. Megaloblastic anemia  ***  - Vitamin B12 & Folate    7. High glucose  ***  - Hemoglobin A1c       Follow Up  No follow-ups on file.    Donnell VARGAS [facility]  [address] [address] [phone]

## 2024-06-25 ENCOUNTER — TELEPHONE (OUTPATIENT)
Dept: FAMILY MEDICINE CLINIC | Facility: CLINIC | Age: 69
End: 2024-06-25
Payer: MEDICARE

## 2024-06-25 LAB
25(OH)D3+25(OH)D2 SERPL-MCNC: 30.8 NG/ML (ref 30–100)
ALBUMIN SERPL-MCNC: 4.2 G/DL (ref 3.8–4.8)
ALP SERPL-CCNC: 83 IU/L (ref 44–121)
ALT SERPL-CCNC: 43 IU/L (ref 0–44)
AST SERPL-CCNC: 29 IU/L (ref 0–40)
BASOPHILS # BLD AUTO: 0.1 X10E3/UL (ref 0–0.2)
BASOPHILS NFR BLD AUTO: 1 %
BILIRUB SERPL-MCNC: 0.5 MG/DL (ref 0–1.2)
BUN SERPL-MCNC: 15 MG/DL (ref 8–27)
BUN/CREAT SERPL: 16 (ref 10–24)
CALCIUM SERPL-MCNC: 10 MG/DL (ref 8.6–10.2)
CHLORIDE SERPL-SCNC: 101 MMOL/L (ref 96–106)
CO2 SERPL-SCNC: 25 MMOL/L (ref 20–29)
CREAT SERPL-MCNC: 0.96 MG/DL (ref 0.76–1.27)
EGFRCR SERPLBLD CKD-EPI 2021: 85 ML/MIN/1.73
EOSINOPHIL # BLD AUTO: 0.3 X10E3/UL (ref 0–0.4)
EOSINOPHIL NFR BLD AUTO: 4 %
ERYTHROCYTE [DISTWIDTH] IN BLOOD BY AUTOMATED COUNT: 13.1 % (ref 11.6–15.4)
FOLATE SERPL-MCNC: 9.8 NG/ML
GLOBULIN SER CALC-MCNC: 2.8 G/DL (ref 1.5–4.5)
GLUCOSE SERPL-MCNC: 97 MG/DL (ref 70–99)
HBA1C MFR BLD: 5.8 % (ref 4.8–5.6)
HCT VFR BLD AUTO: 46.2 % (ref 37.5–51)
HGB BLD-MCNC: 15 G/DL (ref 13–17.7)
IMM GRANULOCYTES # BLD AUTO: 0 X10E3/UL (ref 0–0.1)
IMM GRANULOCYTES NFR BLD AUTO: 0 %
LYMPHOCYTES # BLD AUTO: 3 X10E3/UL (ref 0.7–3.1)
LYMPHOCYTES NFR BLD AUTO: 36 %
MCH RBC QN AUTO: 30.7 PG (ref 26.6–33)
MCHC RBC AUTO-ENTMCNC: 32.5 G/DL (ref 31.5–35.7)
MCV RBC AUTO: 95 FL (ref 79–97)
MONOCYTES # BLD AUTO: 0.8 X10E3/UL (ref 0.1–0.9)
MONOCYTES NFR BLD AUTO: 9 %
NEUTROPHILS # BLD AUTO: 4.2 X10E3/UL (ref 1.4–7)
NEUTROPHILS NFR BLD AUTO: 50 %
PLATELET # BLD AUTO: 224 X10E3/UL (ref 150–450)
POTASSIUM SERPL-SCNC: 4.5 MMOL/L (ref 3.5–5.2)
PROT SERPL-MCNC: 7 G/DL (ref 6–8.5)
RBC # BLD AUTO: 4.88 X10E6/UL (ref 4.14–5.8)
SODIUM SERPL-SCNC: 138 MMOL/L (ref 134–144)
TSH SERPL DL<=0.005 MIU/L-ACNC: 4.02 UIU/ML (ref 0.45–4.5)
VIT B12 SERPL-MCNC: 1341 PG/ML (ref 232–1245)
WBC # BLD AUTO: 8.4 X10E3/UL (ref 3.4–10.8)

## 2024-06-25 NOTE — TELEPHONE ENCOUNTER
Pt called stated. He will be taking 500 mcg of b12 3 times a week and to take lisinopril 10 mg  half a pill for 30 days and then to d/c it. If pt 's bp starts going up again pt will call us and dr erickson can decide if pt needs to go back on it

## 2024-06-26 LAB
CHOLEST SERPL-MCNC: 125 MG/DL (ref 100–199)
HDLC SERPL-MCNC: 39 MG/DL
LDLC SERPL CALC-MCNC: 57 MG/DL (ref 0–99)
TRIGL SERPL-MCNC: 175 MG/DL (ref 0–149)
VLDLC SERPL CALC-MCNC: 29 MG/DL (ref 5–40)
WRITTEN AUTHORIZATION: NORMAL

## 2024-07-15 ENCOUNTER — OFFICE VISIT (OUTPATIENT)
Dept: FAMILY MEDICINE CLINIC | Facility: CLINIC | Age: 69
End: 2024-07-15
Payer: MEDICARE

## 2024-07-15 VITALS
BODY MASS INDEX: 30.61 KG/M2 | SYSTOLIC BLOOD PRESSURE: 110 MMHG | WEIGHT: 226 LBS | OXYGEN SATURATION: 96 % | DIASTOLIC BLOOD PRESSURE: 70 MMHG | HEART RATE: 94 BPM | HEIGHT: 72 IN

## 2024-07-15 DIAGNOSIS — J40 BRONCHITIS: Primary | ICD-10-CM

## 2024-07-15 PROCEDURE — 1159F MED LIST DOCD IN RCRD: CPT | Performed by: NURSE PRACTITIONER

## 2024-07-15 PROCEDURE — 3078F DIAST BP <80 MM HG: CPT | Performed by: NURSE PRACTITIONER

## 2024-07-15 PROCEDURE — 1160F RVW MEDS BY RX/DR IN RCRD: CPT | Performed by: NURSE PRACTITIONER

## 2024-07-15 PROCEDURE — 1125F AMNT PAIN NOTED PAIN PRSNT: CPT | Performed by: NURSE PRACTITIONER

## 2024-07-15 PROCEDURE — 3074F SYST BP LT 130 MM HG: CPT | Performed by: NURSE PRACTITIONER

## 2024-07-15 PROCEDURE — 99214 OFFICE O/P EST MOD 30 MIN: CPT | Performed by: NURSE PRACTITIONER

## 2024-07-15 RX ORDER — GUAIFENESIN 600 MG/1
1200 TABLET, EXTENDED RELEASE ORAL 2 TIMES DAILY
Qty: 40 TABLET | Refills: 0 | Status: SHIPPED | OUTPATIENT
Start: 2024-07-15

## 2024-07-15 RX ORDER — ALBUTEROL SULFATE 90 UG/1
2 AEROSOL, METERED RESPIRATORY (INHALATION) EVERY 4 HOURS PRN
Qty: 18 G | Refills: 0 | Status: SHIPPED | OUTPATIENT
Start: 2024-07-15

## 2024-07-15 RX ORDER — DEXTROMETHORPHAN HYDROBROMIDE AND PROMETHAZINE HYDROCHLORIDE 15; 6.25 MG/5ML; MG/5ML
5 SYRUP ORAL 4 TIMES DAILY PRN
Qty: 120 ML | Refills: 0 | Status: SHIPPED | OUTPATIENT
Start: 2024-07-15

## 2024-07-15 RX ORDER — AZITHROMYCIN 250 MG/1
TABLET, FILM COATED ORAL
Qty: 6 TABLET | Refills: 0 | Status: SHIPPED | OUTPATIENT
Start: 2024-07-15 | End: 2024-07-19

## 2024-07-15 NOTE — PROGRESS NOTES
"Chief Complaint  Sinus Problem    Subjective          Len Saenz presents to [facility]  History of Present Illness  Pt has had sinus congestion, drainage, and pressure x 6 days. He has noticed wheezing at times. He denies fever, chills, or myalgias.   Sinus Problem  Associated symptoms include congestion and coughing. Pertinent negatives include no shortness of breath.       Objective   Vital Signs:   /70   Pulse 94   Ht 182.9 cm (72\")   Wt 103 kg (226 lb)   SpO2 96%   BMI 30.65 kg/m²     Body mass index is 30.65 kg/m².    Review of Systems   Constitutional:  Negative for fatigue and fever.   HENT:  Positive for congestion.    Respiratory:  Positive for cough and wheezing. Negative for shortness of breath.    Cardiovascular:  Negative for chest pain, palpitations and leg swelling.   Neurological:  Negative for syncope.   Psychiatric/Behavioral:  The patient is not nervous/anxious.           Current Outpatient Medications:     aspirin 81 MG EC tablet, Take 1 tablet by mouth Daily., Disp: , Rfl:     Cholecalciferol (Vitamin D-3) 25 MCG (1000 UT) capsule, Take 1,000 Units by mouth Daily., Disp: , Rfl:     lisinopril (PRINIVIL,ZESTRIL) 10 MG tablet, TAKE 1 TABLET BY MOUTH DAILY, Disp: 90 tablet, Rfl: 3    rosuvastatin (CRESTOR) 10 MG tablet, TAKE 1 TABLET BY MOUTH DAILY, Disp: 90 tablet, Rfl: 3    vitamin B-12 (CYANOCOBALAMIN) 100 MCG tablet, Take 0.5 tablets by mouth Daily., Disp: , Rfl:     albuterol sulfate  (90 Base) MCG/ACT inhaler, Inhale 2 puffs Every 4 (Four) Hours As Needed for Wheezing., Disp: 18 g, Rfl: 0    azithromycin (Zithromax Z-Ney) 250 MG tablet, Take 2 tablets by mouth Daily for 1 day, THEN 1 tablet Daily for 4 days., Disp: 6 tablet, Rfl: 0    guaiFENesin (Mucinex) 600 MG 12 hr tablet, Take 2 tablets by mouth 2 (Two) Times a Day., Disp: 40 tablet, Rfl: 0    promethazine-dextromethorphan (PROMETHAZINE-DM) 6.25-15 MG/5ML syrup, Take 5 mL by mouth 4 " (Four) Times a Day As Needed for Cough., Disp: 120 mL, Rfl: 0      Allergies: Bactrim [sulfamethoxazole-trimethoprim]    Physical Exam  Constitutional:       Appearance: Normal appearance.   HENT:      Head: Normocephalic.   Eyes:      Conjunctiva/sclera: Conjunctivae normal.      Pupils: Pupils are equal, round, and reactive to light.   Cardiovascular:      Rate and Rhythm: Normal rate and regular rhythm.      Heart sounds: Normal heart sounds.   Pulmonary:      Effort: Pulmonary effort is normal.      Breath sounds: Normal breath sounds.   Abdominal:      Tenderness: There is no abdominal tenderness.   Musculoskeletal:         General: Normal range of motion.   Skin:     General: Skin is warm and dry.      Capillary Refill: Capillary refill takes less than 2 seconds.   Neurological:      General: No focal deficit present.      Mental Status: He is alert and oriented to person, place, and time.   Psychiatric:         Mood and Affect: Mood normal.         Behavior: Behavior normal.         Thought Content: Thought content normal.         Judgment: Judgment normal.          Result Review :                   Assessment and Plan    Diagnoses and all orders for this visit:    1. Bronchitis (Primary)  Comments:  Increase fluids. Albuterol, Mucinex, and Phen DM PRN. Finish antibiotics. RTC if not improving in 1 week or sooner for worsened sx.  Orders:  -     azithromycin (Zithromax Z-Ney) 250 MG tablet; Take 2 tablets by mouth Daily for 1 day, THEN 1 tablet Daily for 4 days.  Dispense: 6 tablet; Refill: 0  -     promethazine-dextromethorphan (PROMETHAZINE-DM) 6.25-15 MG/5ML syrup; Take 5 mL by mouth 4 (Four) Times a Day As Needed for Cough.  Dispense: 120 mL; Refill: 0  -     guaiFENesin (Mucinex) 600 MG 12 hr tablet; Take 2 tablets by mouth 2 (Two) Times a Day.  Dispense: 40 tablet; Refill: 0  -     albuterol sulfate  (90 Base) MCG/ACT inhaler; Inhale 2 puffs Every 4 (Four) Hours As Needed for Wheezing.  Dispense:  18 g; Refill: 0                Follow Up   Return in about 1 week (around 7/22/2024) for if not improving or sooner if symptoms worsen.  Patient was given instructions and counseling regarding his condition or for health maintenance advice. Please see specific information pulled into the AVS if appropriate.     Juanita Villarreal APRN

## 2024-10-04 ENCOUNTER — OFFICE VISIT (OUTPATIENT)
Dept: FAMILY MEDICINE CLINIC | Facility: CLINIC | Age: 69
End: 2024-10-04
Payer: MEDICARE

## 2024-10-04 VITALS
OXYGEN SATURATION: 98 % | HEART RATE: 85 BPM | DIASTOLIC BLOOD PRESSURE: 92 MMHG | SYSTOLIC BLOOD PRESSURE: 136 MMHG | WEIGHT: 228 LBS | HEIGHT: 72 IN | BODY MASS INDEX: 30.88 KG/M2

## 2024-10-04 DIAGNOSIS — I10 ESSENTIAL HYPERTENSION: Primary | ICD-10-CM

## 2024-10-04 PROCEDURE — 99213 OFFICE O/P EST LOW 20 MIN: CPT | Performed by: FAMILY MEDICINE

## 2024-10-04 PROCEDURE — 1159F MED LIST DOCD IN RCRD: CPT | Performed by: FAMILY MEDICINE

## 2024-10-04 PROCEDURE — 1125F AMNT PAIN NOTED PAIN PRSNT: CPT | Performed by: FAMILY MEDICINE

## 2024-10-04 PROCEDURE — 3075F SYST BP GE 130 - 139MM HG: CPT | Performed by: FAMILY MEDICINE

## 2024-10-04 PROCEDURE — 3080F DIAST BP >= 90 MM HG: CPT | Performed by: FAMILY MEDICINE

## 2024-10-04 PROCEDURE — 1160F RVW MEDS BY RX/DR IN RCRD: CPT | Performed by: FAMILY MEDICINE

## 2024-10-04 RX ORDER — LISINOPRIL 10 MG/1
5 TABLET ORAL DAILY
Start: 2024-10-04

## 2024-10-04 NOTE — PROGRESS NOTES
Office Note     Name: Len Saenz    : 1955     MRN: [id number]     Chief Complaint  Hypertension (Follow up/)    Subjective     History of Present Illness:  Len Saenz is a 69 y.o. male who presents today for who quit his lisinopril 10 down to 5 down to DC and it  4 months later it get up but to 138/88 138/90 it is too high    Review of Systems:   Review of Systems    Past Medical History:   Past Medical History:   Diagnosis Date    Anxiety state     Appendicitis     Cancer     Prostate cancer, starts treatment approx. 2022. Low grade    Chest pain     LHCATH-50% LAD NONOBSTRUCTIVE PER CARDIOLOGY    Coronary arteriosclerosis     Coronary artery disease     Drug dependence     Herniated lumbar intervertebral disc  EPIDURAL STERIOD X2 , SUCCESFUL     W LEFT LEG RADICULOPATHY    High risk medication use     Hyperlipidemia     Hypertension     Megaloblastic anemia     Nicotine dependence     Prostate cancer     Spondylosis     LUMBAR SPINE    Vitamin D deficiency        Past Surgical History:   Past Surgical History:   Procedure Laterality Date    APPENDECTOMY      CARDIAC CATHETERIZATION      3/2012 with Dr. Rodgers    COLONOSCOPY  2017    CYBERKNIFE  2022    prostate    PROSTATE BIOPSY      PROSTATE FIDUCIAL MARKER PLACEMENT      22       Family History:   Family History   Problem Relation Age of Onset    Heart disease Mother         CHF    Liver disease Father     Alcohol abuse Father     Heart disease Brother     Heart disease Brother     Ovarian cancer Daughter     Heart disease Other         PACEMAKER    NOT SPECIFIED WHICH BROTHER    Hypertension Other         NOT SPECIFIED    Hyperlipidemia Other         NOT SPECIFIED    Coronary artery disease Other         NOT SPECIFIED  (PREMATURE CAD)    Prostate cancer Other         NOT SPECIFIED    Hyperlipidemia Other     Hypertension Other        Social History:   Social History     Socioeconomic History    Marital  "status:    Tobacco Use    Smoking status: Former     Current packs/day: 0.00     Types: Cigarettes     Quit date: 2012     Years since quittin.8     Passive exposure: Past    Smokeless tobacco: Never    Tobacco comments:     Social    Vaping Use    Vaping status: Never Used   Substance and Sexual Activity    Alcohol use: Yes     Comment: Social only once per month    Drug use: Never    Sexual activity: Not Currently       Immunizations:   Immunization History   Administered Date(s) Administered    COVID-19 (PFIZER) Purple Cap Monovalent 2021, 2021, 2021    Fluad Quad 65+ 10/19/2021, 10/05/2022    Fluzone  >6mos 10/18/2016    Fluzone (or Fluarix & Flulaval for VFC) >6mos 10/19/2017    Fluzone High-Dose 65+yrs 2020, 2023    Hepatitis A 10/08/2018, 04/10/2019    Influenza, Unspecified 2020, 10/04/2022    Pneumococcal Polysaccharide (PPSV23) 10/16/2020    Shingrix 2019, 2020    Tdap 2012    Zoster, Unspecified 2019, 2020        Medications:     Current Outpatient Medications:     aspirin 81 MG EC tablet, Take 1 tablet by mouth Daily., Disp: , Rfl:     Cholecalciferol (Vitamin D-3) 25 MCG (1000 UT) capsule, Take 1,000 Units by mouth Daily., Disp: , Rfl:     lisinopril (PRINIVIL,ZESTRIL) 10 MG tablet, Take 0.5 tablets by mouth Daily., Disp: , Rfl:     rosuvastatin (CRESTOR) 10 MG tablet, TAKE 1 TABLET BY MOUTH DAILY, Disp: 90 tablet, Rfl: 3    vitamin B-12 (CYANOCOBALAMIN) 100 MCG tablet, Take 0.5 tablets by mouth 3 (Three) Times a Week., Disp: , Rfl:     Allergies:   Allergies   Allergen Reactions    Bactrim [Sulfamethoxazole-Trimethoprim] Rash     Rash, Hives and Itching       Objective     Vital Signs  /92   Pulse 85   Ht 182.9 cm (72\")   Wt 103 kg (228 lb)   SpO2 98%   BMI 30.92 kg/m²   Estimated body mass index is 30.92 kg/m² as calculated from the following:    Height as of this encounter: 182.9 cm (72\").    Weight as of " this encounter: 103 kg (228 lb).            Physical Exam  Vitals and nursing note reviewed.   Constitutional:       Appearance: Normal appearance. He is obese.   HENT:      Head: Normocephalic.      Right Ear: External ear normal.      Left Ear: External ear normal.      Nose: Nose normal.   Eyes:      Pupils: Pupils are equal, round, and reactive to light.   Musculoskeletal:      Cervical back: Normal range of motion and neck supple.   Skin:     General: Skin is warm and dry.   Neurological:      Mental Status: He is alert.   Psychiatric:         Mood and Affect: Mood normal.         Behavior: Behavior normal.          Procedures     Assessment and Plan     1. Essential hypertension  Make the 5 mg adjustment and check your blood pressure 4 weeks, but if it is high will increase to 10 mg       Follow Up  Return if symptoms worsen or fail to improve.    @[facility]  [address] [place] [id number]  [phone]    Answers submitted by the patient for this visit:  Primary Reason for Visit (Submitted on 9/27/2024)  What is the primary reason for your visit?: High Blood Pressure  High Blood Pressure Questionnaire (Submitted on 9/27/2024)  Chief Complaint: Hypertension  Chronicity: new  anxiety: Yes  headaches: No  malaise/fatigue: No  orthopnea: No  peripheral edema: No  Agents associated with hypertension: no associated agents  Compliance problems: no compliance problems

## 2025-01-27 ENCOUNTER — OFFICE VISIT (OUTPATIENT)
Dept: CARDIOLOGY | Facility: CLINIC | Age: 70
End: 2025-01-27
Payer: MEDICARE

## 2025-01-27 VITALS
OXYGEN SATURATION: 97 % | DIASTOLIC BLOOD PRESSURE: 78 MMHG | HEIGHT: 72 IN | SYSTOLIC BLOOD PRESSURE: 122 MMHG | BODY MASS INDEX: 31.02 KG/M2 | HEART RATE: 88 BPM | WEIGHT: 229 LBS

## 2025-01-27 DIAGNOSIS — E78.2 MIXED HYPERLIPIDEMIA: Chronic | ICD-10-CM

## 2025-01-27 DIAGNOSIS — I10 ESSENTIAL HYPERTENSION: Chronic | ICD-10-CM

## 2025-01-27 DIAGNOSIS — I25.10 ATHEROSCLEROSIS OF NATIVE CORONARY ARTERY OF NATIVE HEART WITHOUT ANGINA PECTORIS: Primary | Chronic | ICD-10-CM

## 2025-01-27 DIAGNOSIS — E78.5 HYPERLIPIDEMIA LDL GOAL <70: Chronic | ICD-10-CM

## 2025-01-27 PROCEDURE — 3074F SYST BP LT 130 MM HG: CPT | Performed by: INTERNAL MEDICINE

## 2025-01-27 PROCEDURE — 99214 OFFICE O/P EST MOD 30 MIN: CPT | Performed by: INTERNAL MEDICINE

## 2025-01-27 PROCEDURE — 3078F DIAST BP <80 MM HG: CPT | Performed by: INTERNAL MEDICINE

## 2025-01-27 NOTE — PROGRESS NOTES
OFFICE VISIT  NOTE  [facility] CARDIOLOGY      Name: Len Saenz    Date: 2025  MRN:  [id number]  :  1955      REFERRING/PRIMARY PROVIDER:  Donnell Villarreal MD     Chief Complaint   Patient presents with    Atherosclerosis of native coronary artery of native heart w       HPI: Len Saenz is a 69 y.o. male who presents for CAD and abnormal EKG. History of CAD, cath 3/2012 with Dr. Rodgers, 50% LAD lesion at that time.  History of hypertension and hyperlipidemia. Heart rate jumps up and down, can go above 100 at times at home but overall asymptomatic, no palpitations.  Found to have abnormal EKG at PCPs office, computer read junctional rhythm but it was actually sinus.  Benign echo 2021.  Finished radiation treatment for prostate cancer 2022.  Unfortunately his wife passed away 2023.      Reports he is doing better this year, down to 5 mg lisinopril with good blood pressure control at home.  Plays golf for exercise.  No significant palpitations or impaired sleep which she was complaining of at our last visit 2024.    ROS:Pertinent positives as listed in the HPI.  All other systems reviewed and negative.    Past Medical History:   Diagnosis Date    Anxiety state     Appendicitis     Cancer     Prostate cancer, starts treatment approx. 2022. Low grade    Chest pain     LHCATH-50% LAD NONOBSTRUCTIVE PER CARDIOLOGY    Coronary arteriosclerosis     Coronary artery disease     Drug dependence     Herniated lumbar intervertebral disc  EPIDURAL STERIOD X2 , SUCCESFUL     W LEFT LEG RADICULOPATHY    High risk medication use     Hyperlipidemia     Hypertension     Megaloblastic anemia     Nicotine dependence     Prostate cancer     Spondylosis     LUMBAR SPINE    Vitamin D deficiency        Past Surgical History:   Procedure Laterality Date    APPENDECTOMY      CARDIAC CATHETERIZATION      3/2012 with Dr. Rodgers    COLONOSCOPY  2017    CYBERKNIFE  2022    prostate     "PROSTATE BIOPSY      PROSTATE FIDUCIAL MARKER PLACEMENT      22       Social History     Socioeconomic History    Marital status:    Tobacco Use    Smoking status: Former     Current packs/day: 0.00     Types: Cigarettes     Quit date: 2012     Years since quittin.1     Passive exposure: Past    Smokeless tobacco: Never    Tobacco comments:     Social    Vaping Use    Vaping status: Never Used   Substance and Sexual Activity    Alcohol use: Yes     Comment: Social only once per month    Drug use: Never    Sexual activity: Not Currently       Family History   Problem Relation Age of Onset    Heart disease Mother         CHF    Liver disease Father     Alcohol abuse Father     Heart disease Brother     Heart disease Brother     Ovarian cancer Daughter     Heart disease Other         PACEMAKER    NOT SPECIFIED WHICH BROTHER    Hypertension Other         NOT SPECIFIED    Hyperlipidemia Other         NOT SPECIFIED    Coronary artery disease Other         NOT SPECIFIED  (PREMATURE CAD)    Prostate cancer Other         NOT SPECIFIED    Hyperlipidemia Other     Hypertension Other         Allergies   Allergen Reactions    Bactrim [Sulfamethoxazole-Trimethoprim] Rash     Rash, Hives and Itching       Current Outpatient Medications   Medication Instructions    aspirin 81 mg, Daily    lisinopril (PRINIVIL,ZESTRIL) 5 mg, Oral, Daily    rosuvastatin (CRESTOR) 10 mg, Oral, Daily    vitamin B-12 (CYANOCOBALAMIN) 50 mcg, 3 Times Weekly    Vitamin D-3 1,000 Units, Daily       Vitals:    25 1314   BP: 122/78   Pulse: 88   SpO2: 97%   Weight: 104 kg (229 lb)   Height: 182.9 cm (72\")         Body mass index is 31.06 kg/m².    PHYSICAL EXAM:      General Appearance:   · well developed  · well nourished  Neck:  · thyroid not enlarged  · supple  Respiratory:  · no respiratory distress  · normal breath sounds  · no rales  Cardiovascular:  · no jugular venous distention  · regular rhythm  · apical impulse " "normal  · S1 normal, S2 normal  · no S3, no S4   · no murmur  · no rub, no thrill  · carotid pulses normal; no bruit  · pedal pulses normal  · lower extremity edema: none    Skin:   warm, dry      RESULTS:   Procedures    Results for orders placed in visit on 12/14/21    Adult Transthoracic Echo Complete W/ Cont if Necessary Per Protocol    Interpretation Summary  · Left ventricular ejection fraction appears to be 56 - 60%. Left ventricular systolic function is normal.  · Left ventricular diastolic function was normal.        Labs:  Lab Results   Component Value Date    TRIG 175 (H) 06/24/2024    HDL 39 (L) 06/24/2024    LDL 57 06/24/2024    AST 29 06/24/2024    ALT 43 06/24/2024     Lab Results   Component Value Date    HGBA1C 5.8 (H) 06/24/2024     Creatinine   Date Value Ref Range Status   06/24/2024 0.96 0.76 - 1.27 mg/dL Final   06/22/2023 0.94 0.76 - 1.27 mg/dL Final   06/21/2022 1.03 0.76 - 1.27 mg/dL Final     No results found for: \"EGFRIFNONA\"      ASSESSMENT:  Problem List Items Addressed This Visit       Hyperlipidemia LDL goal <70 (Chronic)    Essential hypertension (Chronic)    Atherosclerosis of native coronary artery of native heart without angina pectoris - Primary (Chronic)    Overview     2011 [facility] nonobstructive CAD 50% LAD         Mixed hyperlipidemia (Chronic)       PLAN:  1.  Murmur:  Echo 12/2021 benign with no significant valvular abnormalities   Not present on today's exam, continue monitoring    2.  Palpitations:  Symptoms resolved over the past year  Increase hydration and continue exercise    If any symptoms worsen we can consider adding low-dose metoprolol, at that time would likely discontinued lisinopril due to low normal blood pressure.     3.  Sinus tachycardia:  Advised patient increase water intake to 64 ounces daily.  May need low-dose metoprolol in the future if he becomes symptomatic with palpitations.     4.  CAD:  Continue secondary prevention with aspirin and statin " therapy.  LDL at target on recent labs  Stress test to repeat cath with any significant symptoms     5.  Hyperlipidemia:  Labs reviewed, LDL at target  Continue rosuvastatin at current dose.     6.  Hypertension:  Goal <130/80mmHg   Continue current medical regimen  Doing well on lisinopril 5 mg daily.    7.  Insomnia/grief:  Much improved      Advance Care Planning   ACP discussion was held with the patient during this visit. Patient does not have an advance directive, information provided.          Follow-up   Return in about 1 year (around 1/27/2026).    Jay Aden MD, FACC, [facility]  Interventional Cardiology

## 2025-05-02 ENCOUNTER — OFFICE VISIT (OUTPATIENT)
Dept: FAMILY MEDICINE CLINIC | Facility: CLINIC | Age: 70
End: 2025-05-02
Payer: MEDICARE

## 2025-05-02 VITALS
BODY MASS INDEX: 31.29 KG/M2 | OXYGEN SATURATION: 95 % | SYSTOLIC BLOOD PRESSURE: 120 MMHG | HEART RATE: 97 BPM | HEIGHT: 72 IN | TEMPERATURE: 98.1 F | DIASTOLIC BLOOD PRESSURE: 82 MMHG | WEIGHT: 231 LBS

## 2025-05-02 DIAGNOSIS — R59.1 LYMPHADENOPATHY: ICD-10-CM

## 2025-05-02 DIAGNOSIS — H60.502 ACUTE OTITIS EXTERNA OF LEFT EAR, UNSPECIFIED TYPE: Primary | ICD-10-CM

## 2025-05-02 PROCEDURE — 1160F RVW MEDS BY RX/DR IN RCRD: CPT | Performed by: NURSE PRACTITIONER

## 2025-05-02 PROCEDURE — 99214 OFFICE O/P EST MOD 30 MIN: CPT | Performed by: NURSE PRACTITIONER

## 2025-05-02 PROCEDURE — 3074F SYST BP LT 130 MM HG: CPT | Performed by: NURSE PRACTITIONER

## 2025-05-02 PROCEDURE — 3079F DIAST BP 80-89 MM HG: CPT | Performed by: NURSE PRACTITIONER

## 2025-05-02 PROCEDURE — 1125F AMNT PAIN NOTED PAIN PRSNT: CPT | Performed by: NURSE PRACTITIONER

## 2025-05-02 PROCEDURE — 1159F MED LIST DOCD IN RCRD: CPT | Performed by: NURSE PRACTITIONER

## 2025-05-02 RX ORDER — AMOXICILLIN 875 MG/1
875 TABLET, COATED ORAL 2 TIMES DAILY
Qty: 20 TABLET | Refills: 0 | Status: SHIPPED | OUTPATIENT
Start: 2025-05-02

## 2025-05-02 RX ORDER — CIPROFLOXACIN AND DEXAMETHASONE 3; 1 MG/ML; MG/ML
4 SUSPENSION/ DROPS AURICULAR (OTIC) 2 TIMES DAILY
Qty: 7.5 ML | Refills: 0 | Status: SHIPPED | OUTPATIENT
Start: 2025-05-02 | End: 2025-05-03 | Stop reason: SDUPTHER

## 2025-05-02 RX ORDER — COLISTIN SULFATE, NEOMYCIN SULFATE, THONZONIUM BROMIDE AND HYDROCORTISONE ACETATE 3; 3.3; .5; 1 MG/ML; MG/ML; MG/ML; MG/ML
3 SUSPENSION AURICULAR (OTIC) 4 TIMES DAILY
Qty: 10 ML | Refills: 0 | Status: SHIPPED | OUTPATIENT
Start: 2025-05-02 | End: 2025-05-02

## 2025-05-02 NOTE — PROGRESS NOTES
"Chief Complaint  Earache (Pt is here for left ear ache onset 3 days. ) and Edema (C/o swelling in neck. )    Subjective          Len Saenz presents to [facility]  History of Present Illness  Pt has had popping and pressure in his left ear x 2 days. He denies cough, congestion, fever, chills, or body aches. He denies known sick contacts. He noticed an enlarged node in his left neck yesterday.       History of Present Illness      Objective   Vital Signs:   /82   Pulse 97   Temp 98.1 °F (36.7 °C) (Oral)   Ht 182.9 cm (72\")   Wt 105 kg (231 lb)   SpO2 95%   BMI 31.33 kg/m²     Body mass index is 31.33 kg/m².    Review of Systems   Constitutional:  Negative for fatigue and fever.   HENT:  Positive for ear pain. Negative for congestion.    Respiratory:  Negative for cough and shortness of breath.    Cardiovascular:  Negative for chest pain, palpitations and leg swelling.   Neurological:  Negative for syncope.   Psychiatric/Behavioral:  The patient is not nervous/anxious.           Current Outpatient Medications:     aspirin 81 MG EC tablet, Take 1 tablet by mouth Daily., Disp: , Rfl:     Cholecalciferol (Vitamin D-3) 25 MCG (1000 UT) capsule, Take 1,000 Units by mouth Daily., Disp: , Rfl:     lisinopril (PRINIVIL,ZESTRIL) 10 MG tablet, Take 0.5 tablets by mouth Daily., Disp: , Rfl:     rosuvastatin (CRESTOR) 10 MG tablet, TAKE 1 TABLET BY MOUTH DAILY, Disp: 90 tablet, Rfl: 3    vitamin B-12 (CYANOCOBALAMIN) 100 MCG tablet, Take 0.5 tablets by mouth 3 (Three) Times a Week., Disp: , Rfl:     amoxicillin (AMOXIL) 875 MG tablet, Take 1 tablet by mouth 2 (Two) Times a Day., Disp: 20 tablet, Rfl: 0    neomycin-colistin-hydrocortisone-thonzonium (Cortisporin-TC) 3.3-3-10-0.5 MG/ML otic suspension, Administer 3 drops into the left ear 4 (Four) Times a Day., Disp: 10 mL, Rfl: 0      Allergies: Bactrim [sulfamethoxazole-trimethoprim]    Physical Exam  Constitutional:       Appearance: " Normal appearance.   HENT:      Right Ear: Tympanic membrane and ear canal normal.      Left Ear: Tympanic membrane and ear canal normal.      Ears:      Comments: Left canal erythematous, mildly edematous     Nose: Nose normal.      Mouth/Throat:      Pharynx: No posterior oropharyngeal erythema.   Eyes:      Extraocular Movements: Extraocular movements intact.      Conjunctiva/sclera: Conjunctivae normal.      Pupils: Pupils are equal, round, and reactive to light.   Cardiovascular:      Rate and Rhythm: Normal rate and regular rhythm.      Heart sounds: Normal heart sounds.   Pulmonary:      Effort: Pulmonary effort is normal. No accessory muscle usage.      Breath sounds: Normal breath sounds.   Lymphadenopathy:      Cervical: No cervical adenopathy.      Comments: Tender left superficial cervical node   Skin:     General: Skin is warm and dry.   Neurological:      General: No focal deficit present.      Mental Status: He is alert.   Psychiatric:         Mood and Affect: Mood normal.         Behavior: Behavior normal.         Thought Content: Thought content normal.         Judgment: Judgment normal.          Physical Exam         Result Review :                Results            Assessment and Plan    Diagnoses and all orders for this visit:    1. Acute otitis externa of left ear, unspecified type (Primary)  Comments:  Cover with oral antibiotics and drops. RTC for worsened sx and if not improving in 1 week.  Orders:  -     amoxicillin (AMOXIL) 875 MG tablet; Take 1 tablet by mouth 2 (Two) Times a Day.  Dispense: 20 tablet; Refill: 0  -     neomycin-colistin-hydrocortisone-thonzonium (Cortisporin-TC) 3.3-3-10-0.5 MG/ML otic suspension; Administer 3 drops into the left ear 4 (Four) Times a Day.  Dispense: 10 mL; Refill: 0    2. Lymphadenopathy  Comments:  RTC if not improving in 2 weeks.                  Follow Up   Return in about 1 week (around 5/9/2025) for if not improving or sooner if symptoms  worsen.  Patient was given instructions and counseling regarding his condition or for health maintenance advice. Please see specific information pulled into the AVS if appropriate.     Juanita Villarreal APRN

## 2025-05-03 ENCOUNTER — TELEPHONE (OUTPATIENT)
Dept: FAMILY MEDICINE CLINIC | Facility: CLINIC | Age: 70
End: 2025-05-03
Payer: MEDICARE

## 2025-05-03 RX ORDER — CIPROFLOXACIN AND DEXAMETHASONE 3; 1 MG/ML; MG/ML
4 SUSPENSION/ DROPS AURICULAR (OTIC) 2 TIMES DAILY
Qty: 7.5 ML | Refills: 0 | Status: SHIPPED | OUTPATIENT
Start: 2025-05-03 | End: 2025-05-03

## 2025-05-03 RX ORDER — COLISTIN SULFATE, NEOMYCIN SULFATE, THONZONIUM BROMIDE AND HYDROCORTISONE ACETATE 3; 3.3; .5; 1 MG/ML; MG/ML; MG/ML; MG/ML
3 SUSPENSION AURICULAR (OTIC) 4 TIMES DAILY
Qty: 10 ML | Refills: 0 | Status: SHIPPED | OUTPATIENT
Start: 2025-05-03 | End: 2025-05-03

## 2025-05-03 RX ORDER — NEOMYCIN SULFATE, POLYMYXIN B SULFATE, HYDROCORTISONE 3.5; 10000; 1 MG/ML; [USP'U]/ML; MG/ML
3 SOLUTION/ DROPS AURICULAR (OTIC) 4 TIMES DAILY
Qty: 10 ML | Refills: 0 | Status: SHIPPED | OUTPATIENT
Start: 2025-05-03

## 2025-05-03 NOTE — TELEPHONE ENCOUNTER
Front office spoke to pt and pharmacy. Both stated pharmacy deleted this prescription and it needed to be resent.     Resent eye drops 7.5 g with no refills

## 2025-05-03 NOTE — TELEPHONE ENCOUNTER
Incoming Refill Request      Medication requested (name and dose):   Ciprofloxacin-dexamethasone      [facility]arm[facility] where [facility] should be sent[facility]    Additional details provided by patient:   Rx was sent in yesterday 05/02 and patient states pharmacy told him, that they lost it and needs to be resent    Best call back number:   [phone]    Does the patient have less than a 3 day supply:  [x] Yes  [] No    Richie Handy Rep  05/03/25, 10:01 EDT

## 2025-06-19 ENCOUNTER — TELEPHONE (OUTPATIENT)
Dept: FAMILY MEDICINE CLINIC | Facility: CLINIC | Age: 70
End: 2025-06-19
Payer: MEDICARE

## 2025-06-19 ENCOUNTER — TELEPHONE (OUTPATIENT)
Dept: FAMILY MEDICINE CLINIC | Facility: CLINIC | Age: 70
End: 2025-06-19

## 2025-06-19 NOTE — TELEPHONE ENCOUNTER
Caller: Len Saenz    Relationship: Self    Best call back number: [phone]     What orders are you requesting (i.e. lab or imaging): BLOOD WORK    In what timeframe would the patient need to come in: 07-10-25    Where will you receive your lab/imaging services: OFFICE    Additional notes: PT WOULD LIKE TO HAVE RESULTS READY FOR APPT ON 07-17-25.

## 2025-06-19 NOTE — TELEPHONE ENCOUNTER
hub to relay/schedule: lvm for pt to call back and r/s due to pcp out of office, hub ok to [facility].

## 2025-06-19 NOTE — TELEPHONE ENCOUNTER
Patient would like to have labs drawn prior to appt on 07/17/25. Can we please place those orders?

## 2025-06-20 RX ORDER — ROSUVASTATIN CALCIUM 10 MG/1
10 TABLET, COATED ORAL DAILY
Qty: 30 TABLET | Refills: 0 | Status: SHIPPED | OUTPATIENT
Start: 2025-06-20

## 2025-06-21 DIAGNOSIS — Z12.5 SCREENING PSA (PROSTATE SPECIFIC ANTIGEN): ICD-10-CM

## 2025-06-21 DIAGNOSIS — R53.83 OTHER FATIGUE: ICD-10-CM

## 2025-06-21 DIAGNOSIS — D53.1 MEGALOBLASTIC ANEMIA: ICD-10-CM

## 2025-06-21 DIAGNOSIS — R25.2 CRAMP IN LIMB: ICD-10-CM

## 2025-06-21 DIAGNOSIS — I10 ESSENTIAL HYPERTENSION: Chronic | ICD-10-CM

## 2025-06-21 DIAGNOSIS — E78.5 HYPERLIPIDEMIA LDL GOAL <70: Primary | Chronic | ICD-10-CM

## 2025-06-21 DIAGNOSIS — E55.9 VITAMIN D DEFICIENCY: ICD-10-CM

## 2025-06-21 DIAGNOSIS — R73.09 HIGH GLUCOSE: ICD-10-CM

## 2025-07-14 ENCOUNTER — LAB (OUTPATIENT)
Dept: FAMILY MEDICINE CLINIC | Facility: CLINIC | Age: 70
End: 2025-07-14
Payer: MEDICARE

## 2025-07-17 ENCOUNTER — OFFICE VISIT (OUTPATIENT)
Dept: FAMILY MEDICINE CLINIC | Facility: CLINIC | Age: 70
End: 2025-07-17
Payer: MEDICARE

## 2025-07-17 VITALS
BODY MASS INDEX: 31.02 KG/M2 | HEIGHT: 72 IN | WEIGHT: 229 LBS | OXYGEN SATURATION: 97 % | SYSTOLIC BLOOD PRESSURE: 116 MMHG | DIASTOLIC BLOOD PRESSURE: 80 MMHG | HEART RATE: 85 BPM

## 2025-07-17 DIAGNOSIS — I10 ESSENTIAL HYPERTENSION: Primary | Chronic | ICD-10-CM

## 2025-07-17 DIAGNOSIS — N40.1 LOWER URINARY TRACT SYMPTOMS DUE TO BENIGN PROSTATIC HYPERPLASIA: ICD-10-CM

## 2025-07-17 DIAGNOSIS — I25.10 ATHEROSCLEROSIS OF NATIVE CORONARY ARTERY OF NATIVE HEART WITHOUT ANGINA PECTORIS: Chronic | ICD-10-CM

## 2025-07-17 DIAGNOSIS — C61 PROSTATE CANCER: ICD-10-CM

## 2025-07-17 DIAGNOSIS — E53.8 B12 DEFICIENCY: ICD-10-CM

## 2025-07-17 DIAGNOSIS — E55.9 VITAMIN D DEFICIENCY: ICD-10-CM

## 2025-07-17 DIAGNOSIS — E78.5 HYPERLIPIDEMIA LDL GOAL <70: Chronic | ICD-10-CM

## 2025-07-17 RX ORDER — ROSUVASTATIN CALCIUM 10 MG/1
10 TABLET, COATED ORAL DAILY
Qty: 90 TABLET | Refills: 3 | Status: SHIPPED | OUTPATIENT
Start: 2025-07-17

## 2025-07-17 RX ORDER — LISINOPRIL 5 MG/1
5 TABLET ORAL DAILY
Qty: 90 TABLET | Refills: 3 | Status: SHIPPED | OUTPATIENT
Start: 2025-07-17

## 2025-07-17 NOTE — PROGRESS NOTES
Subjective   The ABCs of the Annual Wellness Visit  Medicare Wellness Visit      Len Saenz is a 70 y.o. patient who presents for a Medicare Wellness Visit.    The following portions of the patient's history were reviewed and   updated as appropriate: allergies, current medications, past family history, past medical history, past social history, past surgical history, and problem list.    Compared to one year ago, the patient's physical   health is the same.  Compared to one year ago, the patient's mental   health is the same.    Recent Hospitalizations:  He was not admitted to the hospital during the last year.     Current Medical Providers:  Patient Care Team:  Donnell Villarreal MD as PCP - General (Family Medicine)  Bernardino Bahena MD as Referring Physician (Urology)  Janak Mart MD as Consulting Physician (Radiation Oncology)  Jay Aden MD as Consulting Physician (Cardiology)  Enrique Saldivar MD as Consulting Physician (Otolaryngology)  Jessica Pagan PA-C as Physician Assistant (Cardiology)    Outpatient Medications Prior to Visit   Medication Sig Dispense Refill    aspirin 81 MG EC tablet Take 1 tablet by mouth Daily.      Cholecalciferol (Vitamin D-3) 25 MCG (1000 UT) capsule Take 1,000 Units by mouth Daily.      rosuvastatin (CRESTOR) 10 MG tablet TAKE 1 TABLET BY MOUTH DAILY 30 tablet 0    vitamin B-12 (CYANOCOBALAMIN) 100 MCG tablet Take 0.5 tablets by mouth 3 (Three) Times a Week.      lisinopril (PRINIVIL,ZESTRIL) 10 MG tablet Take 0.5 tablets by mouth Daily.      amoxicillin (AMOXIL) 875 MG tablet Take 1 tablet by mouth 2 (Two) Times a Day. 20 tablet 0    neomycin-polymyxin-hydrocortisone (CORTISPORIN) 3.5-92602-8 otic solution Administer 3 drops into the left ear 4 (Four) Times a Day. 10 mL 0     No facility-administered medications prior to visit.     No opioid medication identified on active medication list. I have reviewed chart for other potential  high risk  "medication/s and harmful drug interactions in the elderly.      Aspirin is on active medication list. Aspirin use is indicated based on review of current medical condition/s. Pros and cons of this therapy have been discussed today. Benefits of this medication outweigh potential harm.  Patient has been encouraged to continue taking this medication.  .      Patient Active Problem List   Diagnosis    Hyperlipidemia LDL goal <70    Essential hypertension    Dizziness    Chest pain    Atherosclerosis of native coronary artery of native heart without angina pectoris    Abnormal EKG    Prostate cancer    Anxiety    Encounter for long-term (current) use of other medications    Lower urinary tract symptoms due to benign prostatic hyperplasia    Primary erectile dysfunction    Raised prostate specific antigen    Spondylosis    Vitamin D deficiency    Coronary arteriosclerosis    Mixed hyperlipidemia    B12 deficiency     Advance Care Planning Advance Directive is on file.  ACP discussion was held with the patient during this visit. Patient has an advance directive in EMR which is still valid.             Objective   Vitals:    07/17/25 1511   BP: 116/80   Pulse: 85   SpO2: 97%   Weight: 104 kg (229 lb)   Height: 182.9 cm (72\")   PainSc: 0-No pain       Estimated body mass index is 31.06 kg/m² as calculated from the following:    Height as of this encounter: 182.9 cm (72\").    Weight as of this encounter: 104 kg (229 lb).    BMI is >= 30 and <35. (Class 1 Obesity). The following options were offered after discussion;: exercise counseling/recommendations           Does the patient have evidence of cognitive impairment? No  Lab Results   Component Value Date    CHLPL 138 07/14/2025    TRIG 228 (H) 07/14/2025    HDL 38 (L) 07/14/2025    LDL 63 07/14/2025    VLDL 37 07/14/2025    HGBA1C 5.9 (H) 07/14/2025                                                                                                Health  Risk " Assessment    Smoking Status:  Social History     Tobacco Use   Smoking Status Former    Current packs/day: 0.00    Types: Cigarettes    Quit date: 2012    Years since quittin.6    Passive exposure: Past   Smokeless Tobacco Never   Tobacco Comments    Social      Alcohol Consumption:  Social History     Substance and Sexual Activity   Alcohol Use Yes    Comment: Social only once per month       Fall Risk Screen  JEFF Fall Risk Assessment was completed, and patient is at LOW risk for falls.Assessment completed on:2025    Depression Screening   Little interest or pleasure in doing things? Not at all   Feeling down, depressed, or hopeless? Not at all   PHQ-2 Total Score 0      Health Habits and Functional and Cognitive Screenin/10/2025     4:16 PM   Functional & Cognitive Status   Do you have difficulty preparing food and eating? No   Do you have difficulty bathing yourself, getting dressed or grooming yourself? No   Do you have difficulty using the toilet? No   Do you have difficulty moving around from place to place? No   Do you have trouble with steps or getting out of a bed or a chair? No   Current Diet Limited Junk Food   Dental Exam Up to date   Eye Exam Up to date   Exercise (times per week) 5 times per week   Current Exercises Include House Cleaning;Other;Walking;Yard Work   Do you need help using the phone?  No   Are you deaf or do you have serious difficulty hearing?  No   Do you need help to go to places out of walking distance? No   Do you need help shopping? No   Do you need help preparing meals?  No   Do you need help with housework?  No   Do you need help with laundry? No   Do you need help taking your medications? No   Do you need help managing money? No   Do you ever drive or ride in a car without wearing a seat belt? No   Have you felt unusual fatigue (could be tiredness), stress, anger or loneliness in the last month? No   Who do you live with? Alone   If you need help, do  "you have trouble finding someone available to you? No   Have you been bothered in the last four weeks by sexual problems? No   Do you have difficulty concentrating, remembering or making decisions? No           Age-appropriate Screening Schedule:  Refer to the list below for future screening recommendations based on patient's age, sex and/or medical conditions. Orders for these recommended tests are listed in the plan section. The patient has been provided with a written plan.    Health Maintenance List  Health Maintenance   Topic Date Due    AAA SCREEN ONCE  Never done    Pneumococcal Vaccine 50+ (2 of 2 - PCV) 10/16/2021    HEPATITIS C SCREENING  Never done    TDAP/TD VACCINES (2 - Td or Tdap) 08/30/2022    COVID-19 Vaccine (4 - 2024-25 season) 01/17/2026 (Originally 9/1/2024)    INFLUENZA VACCINE  10/01/2025    LIPID PANEL  07/14/2026    ANNUAL WELLNESS VISIT  07/17/2026    COLORECTAL CANCER SCREENING  08/23/2027    ZOSTER VACCINE  Completed                                                                                                                                                CMS Preventative Services Quick Reference  Risk Factors Identified During Encounter  None Identified    The above risks/problems have been discussed with the patient.  Pertinent information has been shared with the patient in the After Visit Summary.  An After Visit Summary and PPPS were made available to the patient.    Follow Up:   Next Medicare Wellness visit to be scheduled in 1 year.         Additional E&M Note during same encounter follows:  Patient has additional, significant, and separately identifiable condition(s)/problem(s) that require work above and beyond the Medicare Wellness Visit     Chief Complaint  Medicare Wellness-subsequent (physical)    Subjective    HPI                   Objective   Vital Signs:  /80   Pulse 85   Ht 182.9 cm (72\")   Wt 104 kg (229 lb)   SpO2 97%   BMI 31.06 kg/m²   Physical " Exam  Vitals and nursing note reviewed.   Constitutional:       General: He is not in acute distress.     Appearance: He is obese. He is not diaphoretic.   HENT:      Head: Normocephalic and atraumatic.      Right Ear: Tympanic membrane, ear canal and external ear normal.      Left Ear: Tympanic membrane, ear canal and external ear normal.      Mouth/Throat:      Mouth: Mucous membranes are dry.   Eyes:      Extraocular Movements: Extraocular movements intact.      Pupils: Pupils are equal, round, and reactive to light.   Neck:      Vascular: No carotid bruit.   Cardiovascular:      Rate and Rhythm: Normal rate and regular rhythm.   Pulmonary:      Effort: Pulmonary effort is normal. No respiratory distress.      Breath sounds: Normal breath sounds. No wheezing or rales.   Abdominal:      General: Abdomen is flat. Bowel sounds are normal.      Palpations: Abdomen is soft. There is no mass.      Tenderness: There is no guarding or rebound.   Musculoskeletal:         General: Normal range of motion.      Cervical back: Normal range of motion and neck supple.      Right lower leg: No edema.      Left lower leg: No edema.   Skin:     General: Skin is warm and dry.   Neurological:      General: No focal deficit present.      Mental Status: He is alert and oriented to person, place, and time.      Motor: No weakness.   Psychiatric:         Mood and Affect: Mood normal.                       Results             Assessment and plan     ..Diagnoses and all orders for this visit:    1. Essential hypertension (Primary)    2. Hyperlipidemia LDL goal <70    3. Atherosclerosis of native coronary artery of native heart without angina pectoris    4. Prostate cancer    5. Lower urinary tract symptoms due to benign prostatic hyperplasia    6. Vitamin D deficiency    7. B12 deficiency        ..Assessment         Follow Up   No follow-ups on file.  Patient was given instructions and counseling regarding his condition or for health  maintenance advice. Please see specific information pulled into the AVS if appropriate.  Patient or patient representative verbalized consent for the use of Ambient Listening during the visit with  Donnell Villarreal MD for chart documentation. 7/17/2025  15:48 EDT